# Patient Record
Sex: MALE | Race: WHITE | Employment: OTHER | URBAN - METROPOLITAN AREA
[De-identification: names, ages, dates, MRNs, and addresses within clinical notes are randomized per-mention and may not be internally consistent; named-entity substitution may affect disease eponyms.]

---

## 2013-10-23 LAB
CHOLESTEROL, TOTAL: 224 MG/DL
CHOLESTEROL/HDL RATIO: ABNORMAL
HDLC SERPL-MCNC: 32 MG/DL (ref 35–70)
LDL CHOLESTEROL CALCULATED: 166 MG/DL (ref 0–160)
TRIGL SERPL-MCNC: 130 MG/DL
VLDLC SERPL CALC-MCNC: 26 MG/DL

## 2018-10-25 LAB
ALBUMIN SERPL-MCNC: 4.7 G/DL
ALP BLD-CCNC: 54 U/L
ALT SERPL-CCNC: 19 U/L
ANION GAP SERPL CALCULATED.3IONS-SCNC: NORMAL MMOL/L
ANTIBODY: NEGATIVE
AST SERPL-CCNC: 15 U/L
BASOPHILS ABSOLUTE: 0.1 /ΜL
BASOPHILS RELATIVE PERCENT: 0.6 %
BILIRUB SERPL-MCNC: 0.8 MG/DL (ref 0.1–1.4)
BILIRUBIN, URINE: NEGATIVE
BLOOD, URINE: NEGATIVE
BUN BLDV-MCNC: 15 MG/DL
CALCIUM SERPL-MCNC: 9.7 MG/DL
CHLORIDE BLD-SCNC: 102 MMOL/L
CHOLESTEROL, TOTAL: 167 MG/DL
CHOLESTEROL/HDL RATIO: ABNORMAL
CLARITY: CLEAR
CO2: 29 MMOL/L
COLOR: YELLOW
CREAT SERPL-MCNC: 1.2 MG/DL
EOSINOPHILS ABSOLUTE: 0.2 /ΜL
EOSINOPHILS RELATIVE PERCENT: 1.7 %
GFR CALCULATED: 67
GLUCOSE BLD-MCNC: 98 MG/DL
GLUCOSE URINE: NEGATIVE
HCT VFR BLD CALC: 45.4 % (ref 41–53)
HDLC SERPL-MCNC: 29 MG/DL (ref 35–70)
HEMOGLOBIN: 15.1 G/DL (ref 13.5–17.5)
KETONES, URINE: NEGATIVE
LDL CHOLESTEROL CALCULATED: 116 MG/DL (ref 0–160)
LEUKOCYTE ESTERASE, URINE: NEGATIVE
LYMPHOCYTES ABSOLUTE: 2.6 /ΜL
LYMPHOCYTES RELATIVE PERCENT: 26 %
MCH RBC QN AUTO: 30.5 PG
MCHC RBC AUTO-ENTMCNC: 33.3 G/DL
MCV RBC AUTO: 91.9 FL
MONOCYTES ABSOLUTE: 0.6 /ΜL
MONOCYTES RELATIVE PERCENT: 5.7 %
NEUTROPHILS ABSOLUTE: 6.7 /ΜL
NEUTROPHILS RELATIVE PERCENT: 66 %
NITRITE, URINE: NEGATIVE
PDW BLD-RTO: 13.7 %
PH UA: 6 (ref 4.5–8)
PLATELET # BLD: 204 K/ΜL
PMV BLD AUTO: NORMAL FL
POTASSIUM SERPL-SCNC: 3.9 MMOL/L
PROTEIN UA: NORMAL
RBC # BLD: 4.94 10^6/ΜL
SODIUM BLD-SCNC: 145 MMOL/L
SPECIFIC GRAVITY, URINE: 1.03
TOTAL PROTEIN: 6.8
TRIGL SERPL-MCNC: 109 MG/DL
UROBILINOGEN, URINE: NORMAL
VLDLC SERPL CALC-MCNC: 22 MG/DL
WBC # BLD: 10.1 10^3/ML

## 2019-01-07 ENCOUNTER — TELEPHONE (OUTPATIENT)
Dept: INTERNAL MEDICINE CLINIC | Age: 59
End: 2019-01-07

## 2019-01-07 NOTE — TELEPHONE ENCOUNTER
Patient last seen 10/25/18 requesting refill  Zolpidem 10 mg to [facility] R [phone]

## 2019-04-09 ENCOUNTER — OFFICE VISIT (OUTPATIENT)
Dept: INTERNAL MEDICINE CLINIC | Age: 59
End: 2019-04-09
Payer: COMMERCIAL

## 2019-04-09 VITALS
WEIGHT: 219.6 LBS | DIASTOLIC BLOOD PRESSURE: 60 MMHG | SYSTOLIC BLOOD PRESSURE: 120 MMHG | HEART RATE: 78 BPM | OXYGEN SATURATION: 98 %

## 2019-04-09 DIAGNOSIS — E78.5 HYPERLIPIDEMIA, UNSPECIFIED HYPERLIPIDEMIA TYPE: ICD-10-CM

## 2019-04-09 DIAGNOSIS — G47.00 INSOMNIA, UNSPECIFIED TYPE: Primary | ICD-10-CM

## 2019-04-09 PROCEDURE — 99213 OFFICE O/P EST LOW 20 MIN: CPT | Performed by: FAMILY MEDICINE

## 2019-04-09 RX ORDER — ATORVASTATIN CALCIUM 10 MG/1
10 TABLET, FILM COATED ORAL DAILY
COMMUNITY
End: 2019-04-09 | Stop reason: SDUPTHER

## 2019-04-09 RX ORDER — ATORVASTATIN CALCIUM 10 MG/1
10 TABLET, FILM COATED ORAL DAILY
Qty: 90 TABLET | Refills: 1 | Status: SHIPPED | OUTPATIENT
Start: 2019-04-09 | End: 2019-11-20

## 2019-04-09 RX ORDER — ZOLPIDEM TARTRATE 10 MG/1
10 TABLET ORAL NIGHTLY PRN
COMMUNITY
Start: 2017-02-15 | End: 2019-04-09 | Stop reason: SDUPTHER

## 2019-04-09 RX ORDER — ZOLPIDEM TARTRATE 10 MG/1
10 TABLET ORAL NIGHTLY PRN
Qty: 90 TABLET | Refills: 0 | Status: SHIPPED | OUTPATIENT
Start: 2019-04-09 | End: 2019-07-08

## 2019-04-09 RX ORDER — VALACYCLOVIR HYDROCHLORIDE 1 G/1
1000 TABLET, FILM COATED ORAL DAILY
COMMUNITY
End: 2020-02-26 | Stop reason: SDUPTHER

## 2019-04-09 ASSESSMENT — PATIENT HEALTH QUESTIONNAIRE - PHQ9
SUM OF ALL RESPONSES TO PHQ QUESTIONS 1-9: 0
SUM OF ALL RESPONSES TO PHQ QUESTIONS 1-9: 0
SUM OF ALL RESPONSES TO PHQ9 QUESTIONS 1 & 2: 0
2. FEELING DOWN, DEPRESSED OR HOPELESS: 0
1. LITTLE INTEREST OR PLEASURE IN DOING THINGS: 0

## 2019-04-14 ASSESSMENT — ENCOUNTER SYMPTOMS
BACK PAIN: 0
COUGH: 0
CHEST TIGHTNESS: 0
ABDOMINAL PAIN: 0
SHORTNESS OF BREATH: 0
NAUSEA: 0

## 2019-04-14 NOTE — PROGRESS NOTES
Maxwell Dewey  1960  63 y.o.  male    SUBJECTIVE:    History of Present Illness  Maxwell Dewey is a 63 y.o. male who presents today for HL and insomnia. Compunet labs reviewed. No Cp or Sob.  -HL-, HDL 29, , . He is on Lipitor 10 mg. He prefers to stay on the lower dose  -Insomnia-- He is on Ambien 10 mg. Sometimes he uses 1/2 a pill. He has struggled with insomnia for years. Review of Systems   Constitutional: Negative for diaphoresis, fatigue and fever. HENT: Negative. Respiratory: Negative for cough, chest tightness and shortness of breath. Cardiovascular: Negative for chest pain and palpitations. Gastrointestinal: Negative for abdominal pain and nausea. Genitourinary: Negative for difficulty urinating. Musculoskeletal: Negative for back pain. Neurological: Negative for dizziness and headaches.    Psychiatric/Behavioral:        No depression       Allergies   Allergen Reactions    Demerol     Simvastatin      Cramps       Past Medical History:   Diagnosis Date    Depression     Exposure to viral disease     GERD with esophagitis     Glaucoma     Herpes labialis     Hyperlipidemia     Insomnia     Major depressive disorder, single episode, unspecified     Osteoarthritis     Right sacral radiculopathy     Tarsal tunnel syndrome        Past Surgical History:   Procedure Laterality Date    COLONOSCOPY  04/09/2018    colon polyps, divertcula, Hem- Dr Gary Henriquez History     Tobacco Use    Smoking status: Never Smoker    Smokeless tobacco: Never Used   Substance Use Topics    Alcohol use: Not on file    Drug use: Not on file       Current Outpatient Medications   Medication Sig Dispense Refill    valACYclovir (VALTREX) 1 g tablet Take 1,000 mg by mouth daily      atorvastatin (LIPITOR) 10 MG tablet Take 1 tablet by mouth daily 90 tablet 1    zolpidem (AMBIEN) 10 MG tablet Take 1 tablet by mouth nightly as needed for Sleep for up to 90 days. 90 tablet 0     No current facility-administered medications for this visit. OBJECTIVE:    /60 (Site: Right Upper Arm, Position: Sitting, Cuff Size: Medium Adult)   Pulse 78   Wt 219 lb 9.6 oz (99.6 kg)   SpO2 98%     Physical Exam   Constitutional: He is oriented to person, place, and time. He appears well-developed. No distress. HENT:   Nose: Nose normal.   Mouth/Throat: Oropharynx is clear and moist.   Eyes: Conjunctivae are normal.   Neck: Neck supple. Cardiovascular: Normal rate, regular rhythm and normal heart sounds. Pulmonary/Chest: Effort normal and breath sounds normal. No respiratory distress. Abdominal: Soft. Bowel sounds are normal. There is no tenderness. Neurological: He is alert and oriented to person, place, and time. No cranial nerve deficit. Psychiatric: He has a normal mood and affect. Vitals reviewed. ASSESSMENT:  1. Insomnia, unspecified type    2. Hyperlipidemia, unspecified hyperlipidemia type        PLAN:  Orders Placed This Encounter   Medications    atorvastatin (LIPITOR) 10 MG tablet     Sig: Take 1 tablet by mouth daily     Dispense:  90 tablet     Refill:  1    zolpidem (AMBIEN) 10 MG tablet     Sig: Take 1 tablet by mouth nightly as needed for Sleep for up to 90 days. Dispense:  90 tablet     Refill:  0   Continue medications  ADR's explained. Use Ambien sparingly  Contract signed  The patient is asked to make an attempt to improve diet and exercise patterns to aid in medical management of this problem. Attestation: The Prescription Monitoring Report for this patient was reviewed today. Silvina Wheeler DO)    Return in about 4 months (around 8/9/2019) for insomnia.     Electronically Signed by Silvina Wheeler DO

## 2019-08-14 ENCOUNTER — OFFICE VISIT (OUTPATIENT)
Dept: INTERNAL MEDICINE CLINIC | Age: 59
End: 2019-08-14
Payer: COMMERCIAL

## 2019-08-14 VITALS
SYSTOLIC BLOOD PRESSURE: 102 MMHG | OXYGEN SATURATION: 96 % | DIASTOLIC BLOOD PRESSURE: 70 MMHG | BODY MASS INDEX: 31.55 KG/M2 | HEART RATE: 70 BPM | WEIGHT: 213 LBS | HEIGHT: 69 IN

## 2019-08-14 DIAGNOSIS — G47.00 INSOMNIA, UNSPECIFIED TYPE: Primary | ICD-10-CM

## 2019-08-14 DIAGNOSIS — F32.A DEPRESSION, UNSPECIFIED DEPRESSION TYPE: ICD-10-CM

## 2019-08-14 PROCEDURE — 99213 OFFICE O/P EST LOW 20 MIN: CPT | Performed by: FAMILY MEDICINE

## 2019-08-14 RX ORDER — SERTRALINE HYDROCHLORIDE 100 MG/1
100 TABLET, FILM COATED ORAL DAILY
Qty: 90 TABLET | Refills: 0 | Status: SHIPPED | OUTPATIENT
Start: 2019-08-14 | End: 2019-11-04 | Stop reason: SDUPTHER

## 2019-08-14 RX ORDER — ZOLPIDEM TARTRATE 10 MG/1
TABLET ORAL NIGHTLY PRN
COMMUNITY
End: 2019-08-14 | Stop reason: SDUPTHER

## 2019-08-14 RX ORDER — ZOLPIDEM TARTRATE 10 MG/1
10 TABLET ORAL NIGHTLY PRN
Qty: 30 TABLET | Refills: 2 | Status: SHIPPED | OUTPATIENT
Start: 2019-08-14 | End: 2019-09-13

## 2019-08-19 ASSESSMENT — ENCOUNTER SYMPTOMS
SHORTNESS OF BREATH: 0
COUGH: 0
BACK PAIN: 0
NAUSEA: 0
CHEST TIGHTNESS: 0
ABDOMINAL PAIN: 0

## 2019-11-04 RX ORDER — SERTRALINE HYDROCHLORIDE 100 MG/1
TABLET, FILM COATED ORAL
Qty: 90 TABLET | Refills: 0 | Status: SHIPPED | OUTPATIENT
Start: 2019-11-04 | End: 2020-02-03

## 2019-11-20 ENCOUNTER — OFFICE VISIT (OUTPATIENT)
Dept: INTERNAL MEDICINE CLINIC | Age: 59
End: 2019-11-20
Payer: COMMERCIAL

## 2019-11-20 VITALS
WEIGHT: 213.6 LBS | HEART RATE: 74 BPM | OXYGEN SATURATION: 96 % | HEIGHT: 69 IN | SYSTOLIC BLOOD PRESSURE: 112 MMHG | DIASTOLIC BLOOD PRESSURE: 74 MMHG | BODY MASS INDEX: 31.64 KG/M2

## 2019-11-20 DIAGNOSIS — Z12.5 SCREENING PSA (PROSTATE SPECIFIC ANTIGEN): ICD-10-CM

## 2019-11-20 DIAGNOSIS — M54.2 NECK PAIN: ICD-10-CM

## 2019-11-20 DIAGNOSIS — E78.5 HYPERLIPIDEMIA, UNSPECIFIED HYPERLIPIDEMIA TYPE: ICD-10-CM

## 2019-11-20 DIAGNOSIS — G47.00 INSOMNIA, UNSPECIFIED TYPE: ICD-10-CM

## 2019-11-20 DIAGNOSIS — F32.A DEPRESSION, UNSPECIFIED DEPRESSION TYPE: ICD-10-CM

## 2019-11-20 DIAGNOSIS — Z00.00 ANNUAL PHYSICAL EXAM: Primary | ICD-10-CM

## 2019-11-20 PROCEDURE — 99396 PREV VISIT EST AGE 40-64: CPT | Performed by: FAMILY MEDICINE

## 2019-11-20 RX ORDER — ZOLPIDEM TARTRATE 10 MG/1
10 TABLET ORAL NIGHTLY PRN
Qty: 30 TABLET | Refills: 2 | Status: SHIPPED | OUTPATIENT
Start: 2019-11-20 | End: 2019-12-20

## 2019-11-24 ASSESSMENT — ENCOUNTER SYMPTOMS
CONSTIPATION: 0
CHEST TIGHTNESS: 0
BLOOD IN STOOL: 0
COUGH: 0
BACK PAIN: 0
ABDOMINAL PAIN: 0
EYES NEGATIVE: 1
DIARRHEA: 0
SHORTNESS OF BREATH: 0
NAUSEA: 0

## 2020-02-03 RX ORDER — SERTRALINE HYDROCHLORIDE 100 MG/1
TABLET, FILM COATED ORAL
Qty: 90 TABLET | Refills: 0 | Status: SHIPPED | OUTPATIENT
Start: 2020-02-03 | End: 2020-02-26 | Stop reason: SDUPTHER

## 2020-02-26 ENCOUNTER — OFFICE VISIT (OUTPATIENT)
Dept: INTERNAL MEDICINE CLINIC | Age: 60
End: 2020-02-26
Payer: COMMERCIAL

## 2020-02-26 VITALS
HEART RATE: 61 BPM | HEIGHT: 69 IN | DIASTOLIC BLOOD PRESSURE: 84 MMHG | BODY MASS INDEX: 30.66 KG/M2 | WEIGHT: 207 LBS | SYSTOLIC BLOOD PRESSURE: 122 MMHG | OXYGEN SATURATION: 98 %

## 2020-02-26 PROCEDURE — G0444 DEPRESSION SCREEN ANNUAL: HCPCS | Performed by: FAMILY MEDICINE

## 2020-02-26 PROCEDURE — 99213 OFFICE O/P EST LOW 20 MIN: CPT | Performed by: FAMILY MEDICINE

## 2020-02-26 RX ORDER — ZOLPIDEM TARTRATE 10 MG/1
10 TABLET ORAL NIGHTLY PRN
Qty: 30 TABLET | Refills: 2 | Status: SHIPPED | OUTPATIENT
Start: 2020-02-26 | End: 2020-05-19 | Stop reason: SDUPTHER

## 2020-02-26 RX ORDER — VALACYCLOVIR HYDROCHLORIDE 1 G/1
1000 TABLET, FILM COATED ORAL DAILY
Qty: 30 TABLET | Refills: 2 | Status: SHIPPED | OUTPATIENT
Start: 2020-02-26 | End: 2020-03-19

## 2020-02-26 RX ORDER — SERTRALINE HYDROCHLORIDE 100 MG/1
100 TABLET, FILM COATED ORAL 2 TIMES DAILY
Qty: 180 TABLET | Refills: 1 | Status: SHIPPED | OUTPATIENT
Start: 2020-02-26 | End: 2020-09-10 | Stop reason: SDUPTHER

## 2020-02-26 ASSESSMENT — PATIENT HEALTH QUESTIONNAIRE - PHQ9
SUM OF ALL RESPONSES TO PHQ QUESTIONS 1-9: 10
4. FEELING TIRED OR HAVING LITTLE ENERGY: 2
5. POOR APPETITE OR OVEREATING: 1
6. FEELING BAD ABOUT YOURSELF - OR THAT YOU ARE A FAILURE OR HAVE LET YOURSELF OR YOUR FAMILY DOWN: 0
1. LITTLE INTEREST OR PLEASURE IN DOING THINGS: 3
3. TROUBLE FALLING OR STAYING ASLEEP: 1
8. MOVING OR SPEAKING SO SLOWLY THAT OTHER PEOPLE COULD HAVE NOTICED. OR THE OPPOSITE, BEING SO FIGETY OR RESTLESS THAT YOU HAVE BEEN MOVING AROUND A LOT MORE THAN USUAL: 0
10. IF YOU CHECKED OFF ANY PROBLEMS, HOW DIFFICULT HAVE THESE PROBLEMS MADE IT FOR YOU TO DO YOUR WORK, TAKE CARE OF THINGS AT HOME, OR GET ALONG WITH OTHER PEOPLE: 1
7. TROUBLE CONCENTRATING ON THINGS, SUCH AS READING THE NEWSPAPER OR WATCHING TELEVISION: 0
9. THOUGHTS THAT YOU WOULD BE BETTER OFF DEAD, OR OF HURTING YOURSELF: 0
SUM OF ALL RESPONSES TO PHQ9 QUESTIONS 1 & 2: 6
SUM OF ALL RESPONSES TO PHQ QUESTIONS 1-9: 10
2. FEELING DOWN, DEPRESSED OR HOPELESS: 3

## 2020-02-26 ASSESSMENT — ENCOUNTER SYMPTOMS
SHORTNESS OF BREATH: 0
CHEST TIGHTNESS: 0
ABDOMINAL PAIN: 0
BACK PAIN: 0
NAUSEA: 0
COUGH: 0

## 2020-02-26 NOTE — PROGRESS NOTES
Sonia Salazar  1960  63 y.o.  male    Chief Complaint   Patient presents with    3 Month Follow-Up         History of Present Illness  Sonia Salazar is a 63 y.o. male who presents today for a check up. He did not do the last labs. No Cp or Sob.  -Pt has insomnia that has been controlled on Ambien. He denies problems with the medication.   -Depression. He has increased the Zoloft 100 to 2 a day. He denies SI or plan. He was feeling down lately. He is trying to go out with his friends and stay active.   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 Respiratory: Negative for cough, chest tightness and shortness of breath. Cardiovascular: Negative for chest pain and palpitations. Gastrointestinal: Negative for abdominal pain and nausea. Genitourinary: Negative for difficulty urinating. Musculoskeletal: Negative for back pain. Neurological: Negative for dizziness and headaches. Psychiatric/Behavioral: Positive for dysphoric mood. Negative for suicidal ideas. Allergies   Allergen Reactions    Demerol     Simvastatin      Cramps       Past Medical History:   Diagnosis Date    Depression     Exposure to viral disease     GERD with esophagitis     Glaucoma     Herpes labialis     Hyperlipidemia     Insomnia     Major depressive disorder, single episode, unspecified     Osteoarthritis     Right sacral radiculopathy     Tarsal tunnel syndrome        Past Surgical History:   Procedure Laterality Date    COLONOSCOPY  04/09/2018    colon polyps, divertcula, Hem- Dr Sravani Prater         History reviewed. No pertinent family history.     Social History     Tobacco Use    Smoking status: Never Smoker    Smokeless tobacco: Never Used   Substance Use Topics    Alcohol use: Not on file    Drug use: Not on file       Current Outpatient Medications   Medication Sig Dispense

## 2020-03-19 RX ORDER — VALACYCLOVIR HYDROCHLORIDE 1 G/1
TABLET, FILM COATED ORAL
Qty: 30 TABLET | Refills: 3 | Status: SHIPPED | OUTPATIENT
Start: 2020-03-19 | End: 2020-06-23 | Stop reason: SDUPTHER

## 2020-05-19 RX ORDER — ZOLPIDEM TARTRATE 10 MG/1
10 TABLET ORAL NIGHTLY PRN
Qty: 30 TABLET | Refills: 2 | Status: SHIPPED | OUTPATIENT
Start: 2020-05-19 | End: 2020-06-18

## 2020-06-23 RX ORDER — VALACYCLOVIR HYDROCHLORIDE 1 G/1
1000 TABLET, FILM COATED ORAL DAILY
Qty: 30 TABLET | Refills: 3 | Status: SHIPPED | OUTPATIENT
Start: 2020-06-23

## 2020-08-04 ENCOUNTER — OFFICE VISIT (OUTPATIENT)
Dept: INTERNAL MEDICINE CLINIC | Age: 60
End: 2020-08-04
Payer: COMMERCIAL

## 2020-08-04 VITALS
DIASTOLIC BLOOD PRESSURE: 62 MMHG | HEIGHT: 70 IN | TEMPERATURE: 97.2 F | OXYGEN SATURATION: 92 % | HEART RATE: 69 BPM | SYSTOLIC BLOOD PRESSURE: 104 MMHG | BODY MASS INDEX: 30.12 KG/M2 | WEIGHT: 210.4 LBS

## 2020-08-04 PROBLEM — F33.1 MODERATE EPISODE OF RECURRENT MAJOR DEPRESSIVE DISORDER (HCC): Status: ACTIVE | Noted: 2020-08-04

## 2020-08-04 PROBLEM — F51.01 PRIMARY INSOMNIA: Status: ACTIVE | Noted: 2020-08-04

## 2020-08-04 PROCEDURE — 99213 OFFICE O/P EST LOW 20 MIN: CPT | Performed by: FAMILY MEDICINE

## 2020-08-04 RX ORDER — ZOLPIDEM TARTRATE 10 MG/1
10 TABLET ORAL NIGHTLY
Qty: 30 TABLET | Refills: 2 | Status: SHIPPED | OUTPATIENT
Start: 2020-08-04 | End: 2020-09-03

## 2020-08-04 RX ORDER — ZOLPIDEM TARTRATE 10 MG/1
10 TABLET ORAL NIGHTLY
COMMUNITY
Start: 2020-07-24 | End: 2020-08-04 | Stop reason: SDUPTHER

## 2020-08-04 ASSESSMENT — ENCOUNTER SYMPTOMS
BACK PAIN: 0
CHEST TIGHTNESS: 0
ABDOMINAL PAIN: 0
NAUSEA: 0
SHORTNESS OF BREATH: 0
COUGH: 0

## 2020-08-04 NOTE — PROGRESS NOTES
daily 30 tablet 3    sertraline (ZOLOFT) 100 MG tablet Take 1 tablet by mouth 2 times daily 180 tablet 1     No current facility-administered medications for this visit. OBJECTIVE:    /62   Pulse 69   Temp 97.2 °F (36.2 °C)   Ht 5' 10\" (1.778 m)   Wt 210 lb 6.4 oz (95.4 kg)   SpO2 92%   BMI 30.19 kg/m²     Physical Exam  Vitals signs reviewed. Constitutional:       General: He is not in acute distress. Appearance: He is well-developed. Eyes:      Extraocular Movements: Extraocular movements intact. Conjunctiva/sclera: Conjunctivae normal.      Pupils: Pupils are equal, round, and reactive to light. Neck:      Musculoskeletal: Neck supple. Muscular tenderness (posterior muscle tension) present. Cardiovascular:      Rate and Rhythm: Normal rate and regular rhythm. Heart sounds: Normal heart sounds. Pulmonary:      Effort: Pulmonary effort is normal. No respiratory distress. Breath sounds: Normal breath sounds. Abdominal:      General: Bowel sounds are normal.      Palpations: Abdomen is soft. Tenderness: There is no abdominal tenderness. Musculoskeletal:      Right lower leg: No edema. Left lower leg: No edema. Neurological:      Mental Status: He is alert and oriented to person, place, and time. Cranial Nerves: No cranial nerve deficit. Sensory: No sensory deficit. Motor: No weakness. Gait: Gait normal.   Psychiatric:         Mood and Affect: Mood normal.         ASSESSMENT:  1. Primary insomnia    2. Moderate episode of recurrent major depressive disorder (HCC)    3. Strain of neck muscle, initial encounter        PLAN:  Orders Placed This Encounter   Medications    zolpidem (AMBIEN) 10 MG tablet     Sig: Take 1 tablet by mouth nightly for 30 days.      Dispense:  30 tablet     Refill:  2   Pt wants to monitor neck symptoms  Refills  ADR's explained  Obtain labs  Persist RTO or call         Return in about 3 months (around

## 2020-08-19 LAB
ALBUMIN SERPL-MCNC: 4.3 G/DL
ALP BLD-CCNC: 53 U/L
ALT SERPL-CCNC: 19 U/L
ANION GAP SERPL CALCULATED.3IONS-SCNC: NORMAL MMOL/L
AST SERPL-CCNC: 18 U/L
BASOPHILS ABSOLUTE: 0.1 /ΜL
BASOPHILS RELATIVE PERCENT: 1 %
BILIRUB SERPL-MCNC: 0.5 MG/DL (ref 0.1–1.4)
BUN BLDV-MCNC: 17 MG/DL
CALCIUM SERPL-MCNC: 9.2 MG/DL
CHLORIDE BLD-SCNC: 98 MMOL/L
CHOLESTEROL, TOTAL: 280 MG/DL
CHOLESTEROL/HDL RATIO: ABNORMAL
CO2: 27 MMOL/L
CREAT SERPL-MCNC: 1 MG/DL
EOSINOPHILS ABSOLUTE: 0.2 /ΜL
EOSINOPHILS RELATIVE PERCENT: 2.3 %
GFR CALCULATED: NORMAL
GLUCOSE BLD-MCNC: 88 MG/DL
HCT VFR BLD CALC: 42.9 % (ref 41–53)
HDLC SERPL-MCNC: 30 MG/DL (ref 35–70)
HEMOGLOBIN: 14.5 G/DL (ref 13.5–17.5)
LDL CHOLESTEROL CALCULATED: ABNORMAL
LYMPHOCYTES ABSOLUTE: 3.1 /ΜL
LYMPHOCYTES RELATIVE PERCENT: 29.8 %
MCH RBC QN AUTO: 30.9 PG
MCHC RBC AUTO-ENTMCNC: 33.9 G/DL
MCV RBC AUTO: 91.1 FL
MONOCYTES ABSOLUTE: 0.7 /ΜL
MONOCYTES RELATIVE PERCENT: 6.6 %
NEUTROPHILS ABSOLUTE: 6.4 /ΜL
NEUTROPHILS RELATIVE PERCENT: 60.3 %
NONHDLC SERPL-MCNC: ABNORMAL MG/DL
PDW BLD-RTO: 14.2 %
PLATELET # BLD: 218 K/ΜL
PMV BLD AUTO: NORMAL FL
POTASSIUM SERPL-SCNC: 4.5 MMOL/L
PROSTATE SPECIFIC ANTIGEN: 0.65 NG/ML
RBC # BLD: 4.71 10^6/ΜL
SODIUM BLD-SCNC: 138 MMOL/L
TOTAL PROTEIN: 6.6
TRIGL SERPL-MCNC: 186 MG/DL
VLDLC SERPL CALC-MCNC: 37 MG/DL
WBC # BLD: 10.5 10^3/ML

## 2020-09-10 RX ORDER — SERTRALINE HYDROCHLORIDE 100 MG/1
100 TABLET, FILM COATED ORAL 2 TIMES DAILY
Qty: 180 TABLET | Refills: 1 | Status: SHIPPED | OUTPATIENT
Start: 2020-09-10 | End: 2021-03-11 | Stop reason: SDUPTHER

## 2020-12-07 ENCOUNTER — OFFICE VISIT (OUTPATIENT)
Dept: INTERNAL MEDICINE CLINIC | Age: 60
End: 2020-12-07
Payer: COMMERCIAL

## 2020-12-07 VITALS
OXYGEN SATURATION: 98 % | BODY MASS INDEX: 30.8 KG/M2 | DIASTOLIC BLOOD PRESSURE: 70 MMHG | WEIGHT: 220 LBS | HEART RATE: 70 BPM | HEIGHT: 71 IN | SYSTOLIC BLOOD PRESSURE: 120 MMHG | TEMPERATURE: 97.7 F

## 2020-12-07 PROBLEM — E78.5 HYPERLIPIDEMIA: Status: ACTIVE | Noted: 2020-12-07

## 2020-12-07 PROCEDURE — 99396 PREV VISIT EST AGE 40-64: CPT | Performed by: FAMILY MEDICINE

## 2020-12-07 PROCEDURE — 90471 IMMUNIZATION ADMIN: CPT | Performed by: FAMILY MEDICINE

## 2020-12-07 PROCEDURE — 90686 IIV4 VACC NO PRSV 0.5 ML IM: CPT | Performed by: FAMILY MEDICINE

## 2020-12-07 RX ORDER — ZOLPIDEM TARTRATE 5 MG/1
5 TABLET ORAL NIGHTLY PRN
Qty: 30 TABLET | Refills: 2 | Status: SHIPPED | OUTPATIENT
Start: 2020-12-07 | End: 2021-01-06

## 2020-12-07 RX ORDER — ZOLPIDEM TARTRATE 5 MG/1
5 TABLET ORAL NIGHTLY PRN
COMMUNITY
End: 2020-12-07 | Stop reason: SDUPTHER

## 2020-12-07 ASSESSMENT — ENCOUNTER SYMPTOMS
DIARRHEA: 0
CONSTIPATION: 0
NAUSEA: 0
ABDOMINAL PAIN: 0
EYES NEGATIVE: 1
BLOOD IN STOOL: 0
SHORTNESS OF BREATH: 0
BACK PAIN: 0
CHEST TIGHTNESS: 0
COUGH: 0

## 2020-12-07 NOTE — PROGRESS NOTES
Davida Soriano  1960  62 y.o.  male    Chief Complaint   Patient presents with    Annual Exam    Flu Vaccine         History of Present Illness  Davida Soriano is a 62 y.o. male who presents today for a annual exam.  Patient Active Problem List    Diagnosis Date Noted    Glaucoma     Mild episode of recurrent major depressive disorder ([facility] [address]     Hyperlipidemia 12/07/2020    Herpes labialis     Primary insomnia 08/04/2020   -Last labs reviewed. Last C-scope 4/2018 with Dr. Salomon Cm. Recall in 5 years. PSA nl  - Insomnia- He would like a refill on the Ambien. Last fill 8/20. He can still have some sleep disturbance  -Depression- Stable on Zoloft  -HLD- Uncontrolled-chronic. , HDL 30, Trig 186. He is at higher risk and should be on a Statin. He declines. Hx of some cramping with Statins in the past, and he rather not use. Review of Systems   Constitutional: Negative for diaphoresis and fever. HENT: Negative. Eyes: Negative. Respiratory: Negative for cough, chest tightness and shortness of breath. Cardiovascular: Negative for chest pain and palpitations. Gastrointestinal: Negative for abdominal pain, blood in stool, constipation, diarrhea and nausea. Genitourinary: Negative for difficulty urinating. Musculoskeletal: Negative for back pain and neck pain. Neurological: Negative for dizziness and headaches. Psychiatric/Behavioral: Negative for dysphoric mood and suicidal ideas.        Allergies   Allergen Reactions    Demerol     Simvastatin      Cramps       Past Medical History:   Diagnosis Date    Depression     Exposure to viral disease     GERD with esophagitis     Glaucoma     Herpes labialis     Hyperlipidemia     Insomnia     Mild episode of recurrent major depressive disorder (HCC)     Osteoarthritis     Right sacral radiculopathy     Tarsal tunnel syndrome        Past Surgical History:   Procedure Laterality Date    COLONOSCOPY  04/09/2018 colon polyps, divertcula, Hem- Dr Kenton Maldonado         History reviewed. No pertinent family history. Social History     Tobacco Use    Smoking status: Never Smoker    Smokeless tobacco: Never Used   Substance Use Topics    Alcohol use: Not on file    Drug use: Not on file       Current Outpatient Medications   Medication Sig Dispense Refill    zolpidem (AMBIEN) 5 MG tablet Take 1 tablet by mouth nightly as needed for Sleep for up to 30 days. 30 tablet 2    sertraline (ZOLOFT) 100 MG tablet Take 1 tablet by mouth 2 times daily 180 tablet 1    valACYclovir (VALTREX) 1 g tablet Take 1 tablet by mouth daily 30 tablet 3     No current facility-administered medications for this visit. OBJECTIVE:    /70   Pulse 70   Temp 97.7 °F (36.5 °C)   Ht 5' 11\" (1.803 m)   Wt 220 lb (99.8 kg)   SpO2 98%   BMI 30.68 kg/m²     Physical Exam  Vitals signs reviewed. Constitutional:       General: He is not in acute distress. Appearance: He is well-developed. HENT:      Head: Normocephalic and atraumatic. Right Ear: Tympanic membrane normal.      Left Ear: Tympanic membrane normal.      Nose: Nose normal.      Mouth/Throat:      Mouth: Mucous membranes are moist.   Eyes:      Extraocular Movements: Extraocular movements intact. Conjunctiva/sclera: Conjunctivae normal.      Pupils: Pupils are equal, round, and reactive to light. Neck:      Musculoskeletal: Neck supple. Cardiovascular:      Rate and Rhythm: Normal rate and regular rhythm. Pulmonary:      Effort: Pulmonary effort is normal. No respiratory distress. Breath sounds: Normal breath sounds. Abdominal:      General: Bowel sounds are normal.      Palpations: Abdomen is soft. Tenderness: There is no abdominal tenderness. Musculoskeletal: Normal range of motion. Right lower leg: No edema. Left lower leg: No edema.    Neurological: Mental Status: He is alert and oriented to person, place, and time. Cranial Nerves: No cranial nerve deficit. Psychiatric:         Mood and Affect: Mood normal.         ASSESSMENT:  1. Annual physical exam    2. Primary insomnia    3. Mild episode of recurrent major depressive disorder ([facility] [address]    4. Hyperlipidemia, unspecified hyperlipidemia type    5. Need for immunization against influenza        PLAN:  Orders Placed This Encounter   Medications    zolpidem (AMBIEN) 5 MG tablet     Sig: Take 1 tablet by mouth nightly as needed for Sleep for up to 30 days. Dispense:  30 tablet     Refill:  2   Continue medications  He declines cholesterol medications   ADR's explained  Sleep hygiene  Influenza vaccination  The patient is asked to make an attempt to improve diet and exercise patterns to aid in medical management of this problem. Return in about 3 months (around 3/7/2021) for Insomnia.     Electronically Signed by Harry Gonzáles DO

## 2020-12-13 PROBLEM — F33.1 MODERATE EPISODE OF RECURRENT MAJOR DEPRESSIVE DISORDER (HCC): Status: RESOLVED | Noted: 2020-08-04 | Resolved: 2020-12-13

## 2021-03-11 ENCOUNTER — OFFICE VISIT (OUTPATIENT)
Dept: INTERNAL MEDICINE CLINIC | Age: 61
End: 2021-03-11
Payer: COMMERCIAL

## 2021-03-11 VITALS
WEIGHT: 224 LBS | SYSTOLIC BLOOD PRESSURE: 130 MMHG | BODY MASS INDEX: 31.24 KG/M2 | DIASTOLIC BLOOD PRESSURE: 70 MMHG | HEART RATE: 79 BPM | TEMPERATURE: 97 F | OXYGEN SATURATION: 96 %

## 2021-03-11 DIAGNOSIS — F51.01 PRIMARY INSOMNIA: Primary | ICD-10-CM

## 2021-03-11 DIAGNOSIS — F33.0 MILD EPISODE OF RECURRENT MAJOR DEPRESSIVE DISORDER (HCC): ICD-10-CM

## 2021-03-11 PROCEDURE — 99213 OFFICE O/P EST LOW 20 MIN: CPT | Performed by: FAMILY MEDICINE

## 2021-03-11 RX ORDER — ZOLPIDEM TARTRATE 5 MG/1
1 TABLET ORAL DAILY
COMMUNITY
Start: 2021-03-05 | End: 2021-03-11 | Stop reason: SDUPTHER

## 2021-03-11 RX ORDER — ZOLPIDEM TARTRATE 5 MG/1
5 TABLET ORAL DAILY
Qty: 30 TABLET | Refills: 2 | Status: SHIPPED | OUTPATIENT
Start: 2021-03-11 | End: 2021-04-10

## 2021-03-11 RX ORDER — SERTRALINE HYDROCHLORIDE 100 MG/1
100 TABLET, FILM COATED ORAL 2 TIMES DAILY
Qty: 180 TABLET | Refills: 1 | Status: SHIPPED | OUTPATIENT
Start: 2021-03-11 | End: 2021-09-22 | Stop reason: SDUPTHER

## 2021-03-11 SDOH — HEALTH STABILITY: MENTAL HEALTH: HOW MANY STANDARD DRINKS CONTAINING ALCOHOL DO YOU HAVE ON A TYPICAL DAY?: NOT ASKED

## 2021-03-11 ASSESSMENT — ENCOUNTER SYMPTOMS
CHEST TIGHTNESS: 0
ABDOMINAL PAIN: 0
BACK PAIN: 0
COUGH: 0
SHORTNESS OF BREATH: 0
NAUSEA: 0

## 2021-03-11 NOTE — PROGRESS NOTES
Ashley Tolbert  1960  62 y.o.  male    Chief Complaint   Patient presents with    3 Month Follow-Up    Insomnia         History of Present Illness  Ashley Tolbert is a 62 y.o. male who presents today for a check up. Patient Active Problem List    Diagnosis Date Noted    Glaucoma     Mild episode of recurrent major depressive disorder ([facility] [address]     Hyperlipidemia 12/07/2020    Herpes labialis     Primary insomnia 08/04/2020     -Insomnia- On Ambien stable  -Depression- Stable    Review of Systems   Constitutional: Negative for diaphoresis and fever. Respiratory: Negative for cough, chest tightness and shortness of breath. Cardiovascular: Negative for chest pain and palpitations. Gastrointestinal: Negative for abdominal pain and nausea. Genitourinary: Negative for difficulty urinating. Musculoskeletal: Negative for back pain. Neurological: Negative for dizziness and headaches. Psychiatric/Behavioral: Negative for dysphoric mood. Allergies   Allergen Reactions    Demerol     Simvastatin      Cramps       Past Medical History:   Diagnosis Date    Depression     Exposure to viral disease     GERD with esophagitis     Glaucoma     Herpes labialis     Hyperlipidemia     Insomnia     Mild episode of recurrent major depressive disorder (HCC)     Osteoarthritis     Right sacral radiculopathy     Tarsal tunnel syndrome        Past Surgical History:   Procedure Laterality Date    COLONOSCOPY  04/09/2018    colon polyps, divertcula, Hem- Dr Glenn Chacon         History reviewed. No pertinent family history.     Social History     Tobacco Use    Smoking status: Never Smoker    Smokeless tobacco: Never Used   Substance Use Topics    Alcohol use: Never     Frequency: Never     Binge frequency: Never    Drug use: Yes     Types: Marijuana       Current Outpatient Medications   Medication Sig Dispense Refill    zolpidem (AMBIEN) 5 MG tablet Take 1 tablet by mouth daily for 30 days. 30 tablet 2    sertraline (ZOLOFT) 100 MG tablet Take 1 tablet by mouth 2 times daily 180 tablet 1    valACYclovir (VALTREX) 1 g tablet Take 1 tablet by mouth daily (Patient not taking: Reported on 3/11/2021) 30 tablet 3     No current facility-administered medications for this visit. OBJECTIVE:    /70   Pulse 79   Temp 97 °F (36.1 °C)   Wt 224 lb (101.6 kg)   SpO2 96%   BMI 31.24 kg/m²     Physical Exam  Vitals signs reviewed. Constitutional:       General: He is not in acute distress. Eyes:      Conjunctiva/sclera: Conjunctivae normal.   Neck:      Musculoskeletal: Neck supple. Cardiovascular:      Rate and Rhythm: Normal rate and regular rhythm. Pulmonary:      Effort: Pulmonary effort is normal. No respiratory distress. Breath sounds: Normal breath sounds. Abdominal:      General: Bowel sounds are normal.      Palpations: Abdomen is soft. Tenderness: There is no abdominal tenderness. Musculoskeletal:      Right lower leg: No edema. Left lower leg: No edema. Neurological:      Mental Status: He is alert and oriented to person, place, and time. Cranial Nerves: No cranial nerve deficit. Psychiatric:         Mood and Affect: Mood normal.         ASSESSMENT:  1. Primary insomnia    2. Mild episode of recurrent major depressive disorder (HCC)        PLAN:    Orders Placed This Encounter   Medications    zolpidem (AMBIEN) 5 MG tablet     Sig: Take 1 tablet by mouth daily for 30 days. Dispense:  30 tablet     Refill:  2    sertraline (ZOLOFT) 100 MG tablet     Sig: Take 1 tablet by mouth 2 times daily     Dispense:  180 tablet     Refill:  1   Oarrs checked  Continue medications  Any problems call or RTO       Return in about 3 months (around 6/11/2021) for insomnia.     Electronically Signed by Sissy Pratt DO

## 2021-04-19 ENCOUNTER — HOSPITAL ENCOUNTER (OUTPATIENT)
Age: 61
Setting detail: SPECIMEN
Discharge: HOME | End: 2021-04-19
Payer: COMMERCIAL

## 2021-04-19 ENCOUNTER — OFFICE VISIT (OUTPATIENT)
Dept: FAMILY MEDICINE CLINIC | Age: 61
End: 2021-04-19
Payer: COMMERCIAL

## 2021-04-19 ENCOUNTER — TELEPHONE (OUTPATIENT)
Dept: INTERNAL MEDICINE CLINIC | Age: 61
End: 2021-04-19

## 2021-04-19 VITALS
WEIGHT: 220 LBS | DIASTOLIC BLOOD PRESSURE: 78 MMHG | TEMPERATURE: 97.4 F | HEART RATE: 69 BPM | BODY MASS INDEX: 30.8 KG/M2 | SYSTOLIC BLOOD PRESSURE: 122 MMHG | HEIGHT: 71 IN | OXYGEN SATURATION: 98 %

## 2021-04-19 DIAGNOSIS — M54.50 ACUTE BILATERAL LOW BACK PAIN WITHOUT SCIATICA: Primary | ICD-10-CM

## 2021-04-19 DIAGNOSIS — R10.31 RIGHT GROIN PAIN: ICD-10-CM

## 2021-04-19 DIAGNOSIS — N50.811 PAIN IN RIGHT TESTICLE: ICD-10-CM

## 2021-04-19 LAB
BILIRUBIN, POC: NEGATIVE
BLOOD URINE, POC: NEGATIVE
CLARITY, POC: CLEAR
COLOR, POC: YELLOW
GLUCOSE URINE, POC: NEGATIVE
KETONES, POC: NEGATIVE
LEUKOCYTE EST, POC: NEGATIVE
NITRITE, POC: NEGATIVE
PH, POC: 6
PROTEIN, POC: NEGATIVE
SPECIFIC GRAVITY, POC: 1.02
UROBILINOGEN, POC: NORMAL

## 2021-04-19 PROCEDURE — 81002 URINALYSIS NONAUTO W/O SCOPE: CPT | Performed by: NURSE PRACTITIONER

## 2021-04-19 PROCEDURE — 99213 OFFICE O/P EST LOW 20 MIN: CPT | Performed by: NURSE PRACTITIONER

## 2021-04-19 PROCEDURE — 87086 URINE CULTURE/COLONY COUNT: CPT

## 2021-04-19 RX ORDER — METHYLPREDNISOLONE 4 MG/1
TABLET ORAL
Qty: 1 KIT | Refills: 0 | Status: SHIPPED | OUTPATIENT
Start: 2021-04-19 | End: 2021-04-25

## 2021-04-19 RX ORDER — ACETAMINOPHEN 325 MG/1
650 TABLET ORAL EVERY 6 HOURS PRN
COMMUNITY

## 2021-04-19 RX ORDER — ZOLPIDEM TARTRATE 5 MG/1
TABLET ORAL
COMMUNITY
Start: 2021-04-17 | End: 2021-06-10 | Stop reason: SDUPTHER

## 2021-04-19 RX ORDER — TIZANIDINE 2 MG/1
2 TABLET ORAL 3 TIMES DAILY PRN
Qty: 20 TABLET | Refills: 0 | Status: SHIPPED | OUTPATIENT
Start: 2021-04-19 | End: 2021-05-18

## 2021-04-19 RX ORDER — IBUPROFEN 200 MG
200 TABLET ORAL EVERY 6 HOURS PRN
COMMUNITY

## 2021-04-19 ASSESSMENT — ENCOUNTER SYMPTOMS
BOWEL INCONTINENCE: 0
BACK PAIN: 1
ABDOMINAL PAIN: 0

## 2021-04-19 NOTE — PROGRESS NOTES
Bess Strickland   62 y.o.  male  [id number]      Chief Complaint   Patient presents with    Lower Back Pain     patient states that back pain started about a month ago and has since traveled to his lower right abdomen down into his groin over the last week. Patient has taken otc tylenol and ibuprofen with some relief.  Groin Pain        Subjective:  61 y.o.male is here for a follow up. He has the following chronic/acute medical problems:  Patient Active Problem List   Diagnosis    Primary insomnia    Hyperlipidemia    Herpes labialis    Glaucoma    Mild episode of recurrent major depressive disorder (HCC)       Back Pain  This is a new problem. The current episode started 1 to 4 weeks ago (1 month ago). The problem occurs intermittently. The problem is unchanged. The pain is present in the lumbar spine. The quality of the pain is described as aching. Radiates to: radiated to both hip and right groin area. The pain is at a severity of 6/10. The pain is worse during the day. The symptoms are aggravated by sitting. Pertinent negatives include no abdominal pain, bladder incontinence, bowel incontinence, chest pain, dysuria, fever, headaches, leg pain, numbness, paresis, paresthesias, pelvic pain, perianal numbness, tingling, weakness or weight loss. He has tried NSAIDs (been usin ibuprofen twice a day) for the symptoms. The treatment provided moderate relief. Review of Systems   Constitutional: Negative for appetite change, chills, fatigue, fever and weight loss. HENT: Negative for congestion, ear pain, postnasal drip, rhinorrhea, sinus pressure, sinus pain, sneezing and sore throat. Respiratory: Negative for cough, chest tightness, shortness of breath and wheezing. Cardiovascular: Negative for chest pain and palpitations. Gastrointestinal: Negative for abdominal pain, bowel incontinence, diarrhea, nausea and vomiting.    Genitourinary: Negative for bladder incontinence, dysuria and pelvic pain.   Musculoskeletal: Positive for back pain. Skin: Negative for rash. Neurological: Negative for dizziness, tingling, weakness, light-headedness, numbness, headaches and paresthesias. Current Outpatient Medications   Medication Sig Dispense Refill    zolpidem (AMBIEN) 5 MG tablet       acetaminophen (TYLENOL) 325 MG tablet Take 650 mg by mouth every 6 hours as needed for Pain      ibuprofen (ADVIL;MOTRIN) 200 MG tablet Take 200 mg by mouth every 6 hours as needed for Pain      methylPREDNISolone (MEDROL, WERO,) 4 MG tablet Take by mouth. 1 kit 0    tiZANidine (ZANAFLEX) 2 MG tablet Take 1 tablet by mouth 3 times daily as needed (back pain) 20 tablet 0    sertraline (ZOLOFT) 100 MG tablet Take 1 tablet by mouth 2 times daily 180 tablet 1    valACYclovir (VALTREX) 1 g tablet Take 1 tablet by mouth daily (Patient not taking: Reported on 3/11/2021) 30 tablet 3     No current facility-administered medications for this visit. Past medical, family,surgical history reviewed today. Objective:  /78   Pulse 69   Temp 97.4 °F (36.3 °C)   Ht 5' 11\" (1.803 m)   Wt 220 lb (99.8 kg)   SpO2 98%   BMI 30.68 kg/m²   BP Readings from Last 3 Encounters:   04/19/21 122/78   03/11/21 130/70   12/07/20 120/70     Wt Readings from Last 3 Encounters:   04/19/21 220 lb (99.8 kg)   03/11/21 224 lb (101.6 kg)   12/07/20 220 lb (99.8 kg)         Physical Exam  Exam conducted with a chaperone present. Constitutional:       Appearance: Normal appearance. HENT:      Head: Normocephalic. Neck:      Musculoskeletal: Neck supple. Cardiovascular:      Rate and Rhythm: Normal rate and regular rhythm. Heart sounds: Normal heart sounds. Pulmonary:      Effort: Pulmonary effort is normal.      Breath sounds: Normal breath sounds. Genitourinary:     Testes:         Right: Tenderness or swelling not present.       Comments: Right testicle appeared normal.   Musculoskeletal:      Lumbar back: He exhibits pain. He exhibits normal range of motion, no tenderness, no bony tenderness, no swelling and no edema. Skin:     General: Skin is warm and dry. Neurological:      Mental Status: He is alert and oriented to person, place, and time. Psychiatric:         Mood and Affect: Mood normal.         Behavior: Behavior normal.         Lab Results   Component Value Date    WBC 10.5 08/19/2020    HGB 14.5 08/19/2020    HCT 42.9 08/19/2020    MCV 91.1 08/19/2020     08/19/2020     Lab Results   Component Value Date     08/19/2020    K 4.5 08/19/2020    CL 98 08/19/2020    CO2 27 08/19/2020    BUN 17 08/19/2020    CREATININE 1.0 08/19/2020    GLUCOSE 88 08/19/2020    CALCIUM 9.2 08/19/2020    PROT 6.6 08/31/2011    LABALBU 4.3 08/19/2020    BILITOT 0.5 08/19/2020    ALKPHOS 53 08/19/2020    AST 18 08/19/2020    ALT 19 08/19/2020    LABGLOM 67 10/24/2018    GFRAA >60 08/31/2011     Lab Results   Component Value Date    CHOL 280 08/19/2020    CHOL 167 10/24/2018    CHOL 224 (H) 10/22/2013     Lab Results   Component Value Date    TRIG 186 08/19/2020    TRIG 109 10/24/2018    TRIG 130 10/22/2013     Lab Results   Component Value Date    HDL 30 (A) 08/19/2020    HDL 29 (A) 10/24/2018    HDL 32 (L) 10/22/2013     Lab Results   Component Value Date    LDLCALC 116 10/24/2018    LDLCALC 166 (H) 10/22/2013     No results found for: LABA1C  No results found for: TSHFT4, TSH, TSHHS  Results for orders placed or performed in visit on 04/19/21   POCT Urinalysis no Micro   Result Value Ref Range    Color, UA yellow     Clarity, UA clear     Glucose, UA POC Negative     Bilirubin, UA Negative     Ketones, UA Negative     Spec Grav, UA 1.025     Blood, UA POC Negative     pH, UA 6.0     Protein, UA POC Negative     Urobilinogen, UA 0.2 E.U./dL     Leukocytes, UA Negative     Nitrite, UA Negative          ASSESSMENT/PLAN:      1.  Acute bilateral low back pain without sciatica  - methylPREDNISolone (MEDROL, WERO,) 4 MG tablet; Take by mouth. Dispense: 1 kit; Refill: 0  - tiZANidine (ZANAFLEX) 2 MG tablet; Take 1 tablet by mouth 3 times daily as needed (back pain)  Dispense: 20 tablet; Refill: 0    2. Right groin pain  - methylPREDNISolone (MEDROL, WERO,) 4 MG tablet; Take by mouth. Dispense: 1 kit; Refill: 0  - tiZANidine (ZANAFLEX) 2 MG tablet; Take 1 tablet by mouth 3 times daily as needed (back pain)  Dispense: 20 tablet; Refill: 0    3. Pain in right testicle  Urinalysis normal. Will send for urine culture. Will call with results. Will treat the back pain and see if the pain to the testicle and groin is referred pain from the back. - POCT Urinalysis no Micro  - methylPREDNISolone (MEDROL, WERO,) 4 MG tablet; Take by mouth. Dispense: 1 kit; Refill: 0  - tiZANidine (ZANAFLEX) 2 MG tablet; Take 1 tablet by mouth 3 times daily as needed (back pain)  Dispense: 20 tablet; Refill: 0            There are no discontinued medications. Care discussed with patient. Questions answered. Patient verbalizes understanding and agrees with plan. After visit summary provided. Advised to call for any problems, questions, or concerns. Return if symptoms worsen or fail to improve.                                              Signed:  HERACLIO Monae CNP  04/20/21  4:19 PM

## 2021-04-19 NOTE — TELEPHONE ENCOUNTER
Pt left VM on MA line, c/o back pain and requesting appt. Spoke with pt and informed PCP does not have any openings. Referred pt to [facility], and provided number. Pt voiced understanding/thanks. No further action is needed, at this time.

## 2021-04-20 ASSESSMENT — ENCOUNTER SYMPTOMS
VOMITING: 0
WHEEZING: 0
NAUSEA: 0
SHORTNESS OF BREATH: 0
SINUS PAIN: 0
SINUS PRESSURE: 0
RHINORRHEA: 0
SORE THROAT: 0
DIARRHEA: 0
COUGH: 0
CHEST TIGHTNESS: 0

## 2021-04-21 LAB
CULTURE: NORMAL
Lab: NORMAL
SPECIMEN: NORMAL

## 2021-04-26 ENCOUNTER — TELEPHONE (OUTPATIENT)
Dept: INTERNAL MEDICINE CLINIC | Age: 61
End: 2021-04-26

## 2021-04-26 ENCOUNTER — OFFICE VISIT (OUTPATIENT)
Dept: FAMILY MEDICINE CLINIC | Age: 61
End: 2021-04-26
Payer: COMMERCIAL

## 2021-04-26 ENCOUNTER — HOSPITAL ENCOUNTER (OUTPATIENT)
Dept: GENERAL RADIOLOGY | Age: 61
Discharge: HOME | End: 2021-04-26
Payer: COMMERCIAL

## 2021-04-26 ENCOUNTER — HOSPITAL ENCOUNTER (OUTPATIENT)
Age: 61
Discharge: HOME | End: 2021-04-26
Payer: COMMERCIAL

## 2021-04-26 VITALS
OXYGEN SATURATION: 97 % | SYSTOLIC BLOOD PRESSURE: 132 MMHG | TEMPERATURE: 97.2 F | DIASTOLIC BLOOD PRESSURE: 74 MMHG | HEART RATE: 84 BPM | WEIGHT: 222.2 LBS | BODY MASS INDEX: 31.11 KG/M2 | HEIGHT: 71 IN

## 2021-04-26 DIAGNOSIS — M54.50 ACUTE LEFT-SIDED LOW BACK PAIN WITHOUT SCIATICA: ICD-10-CM

## 2021-04-26 DIAGNOSIS — M25.551 RIGHT HIP PAIN: Primary | ICD-10-CM

## 2021-04-26 DIAGNOSIS — R10.31 RIGHT GROIN PAIN: ICD-10-CM

## 2021-04-26 DIAGNOSIS — M25.551 RIGHT HIP PAIN: ICD-10-CM

## 2021-04-26 PROCEDURE — 99213 OFFICE O/P EST LOW 20 MIN: CPT | Performed by: NURSE PRACTITIONER

## 2021-04-26 PROCEDURE — 72100 X-RAY EXAM L-S SPINE 2/3 VWS: CPT

## 2021-04-26 PROCEDURE — 73502 X-RAY EXAM HIP UNI 2-3 VIEWS: CPT

## 2021-04-26 NOTE — PROGRESS NOTES
Yury Section   62 y.o.  male  [id number]      Chief Complaint   Patient presents with    Back Pain        Subjective:  61 y.o.male is here for a follow up. He has the following chronic/acute medical problems:  Patient Active Problem List   Diagnosis    Primary insomnia    Hyperlipidemia    Herpes labialis    Glaucoma    Mild episode of recurrent major depressive disorder ([facility] [address]       Patient is here with complaints of back pain, right hip pain, and right groin pain. Patient was seen here on 4/19 and states the symptoms have not improved. Patient was prescribed at that time a medrol dose pack and Zanaflex. Patient states he has not had any imaging done. Patient states a couple times he has felt like his right hip is giving out. Patient states he continues with the right groin pain if he pushes on the area. Patient denies any pain in his right testicle today. Review of Systems   Constitutional: Negative for appetite change, chills, fatigue and fever. HENT: Negative for congestion, ear pain, postnasal drip, rhinorrhea, sinus pressure, sinus pain, sneezing and sore throat. Respiratory: Negative for cough, chest tightness, shortness of breath and wheezing. Cardiovascular: Negative for chest pain and palpitations. Gastrointestinal: Negative for diarrhea, nausea and vomiting. Musculoskeletal: Positive for back pain. Right hip pain and right groin pain   Skin: Negative for rash. Neurological: Negative for dizziness, light-headedness and headaches.        Current Outpatient Medications   Medication Sig Dispense Refill    zolpidem (AMBIEN) 5 MG tablet       acetaminophen (TYLENOL) 325 MG tablet Take 650 mg by mouth every 6 hours as needed for Pain      ibuprofen (ADVIL;MOTRIN) 200 MG tablet Take 200 mg by mouth every 6 hours as needed for Pain      tiZANidine (ZANAFLEX) 2 MG tablet Take 1 tablet by mouth 3 times daily as needed (back pain) 20 tablet 0    sertraline (ZOLOFT) 100 MG tablet Take 1 tablet by mouth 2 times daily 180 tablet 1    valACYclovir (VALTREX) 1 g tablet Take 1 tablet by mouth daily (Patient not taking: Reported on 3/11/2021) 30 tablet 3     No current facility-administered medications for this visit. Past medical, family,surgical history reviewed today. Objective:  /74   Pulse 84   Temp 97.2 °F (36.2 °C)   Ht 5' 11\" (1.803 m)   Wt 222 lb 3.2 oz (100.8 kg)   SpO2 97%   BMI 30.99 kg/m²   BP Readings from Last 3 Encounters:   04/26/21 132/74   04/19/21 122/78   03/11/21 130/70     Wt Readings from Last 3 Encounters:   04/26/21 222 lb 3.2 oz (100.8 kg)   04/19/21 220 lb (99.8 kg)   03/11/21 224 lb (101.6 kg)         Physical Exam  Constitutional:       Appearance: Normal appearance. HENT:      Head: Normocephalic. Neck:      Musculoskeletal: Neck supple. Cardiovascular:      Rate and Rhythm: Normal rate and regular rhythm. Heart sounds: Normal heart sounds. Pulmonary:      Effort: Pulmonary effort is normal.      Breath sounds: Normal breath sounds. Musculoskeletal:      Right hip: He exhibits decreased range of motion (due to pain) and decreased strength. He exhibits no tenderness, no bony tenderness and no swelling. Lumbar back: He exhibits decreased range of motion (due to pain) and pain. He exhibits no tenderness, no bony tenderness and no swelling. Skin:     General: Skin is warm and dry. Neurological:      Mental Status: He is alert and oriented to person, place, and time.    Psychiatric:         Mood and Affect: Mood normal.         Behavior: Behavior normal.         Lab Results   Component Value Date    WBC 10.5 08/19/2020    HGB 14.5 08/19/2020    HCT 42.9 08/19/2020    MCV 91.1 08/19/2020     08/19/2020     Lab Results   Component Value Date     08/19/2020    K 4.5 08/19/2020    CL 98 08/19/2020    CO2 27 08/19/2020    BUN 17 08/19/2020    CREATININE 1.0 08/19/2020    GLUCOSE 88 08/19/2020    CALCIUM 9.2 08/19/2020    PROT 6.6 08/31/2011    LABALBU 4.3 08/19/2020    BILITOT 0.5 08/19/2020    ALKPHOS 53 08/19/2020    AST 18 08/19/2020    ALT 19 08/19/2020    LABGLOM 67 10/24/2018    GFRAA >60 08/31/2011     Lab Results   Component Value Date    CHOL 280 08/19/2020    CHOL 167 10/24/2018    CHOL 224 (H) 10/22/2013     Lab Results   Component Value Date    TRIG 186 08/19/2020    TRIG 109 10/24/2018    TRIG 130 10/22/2013     Lab Results   Component Value Date    HDL 30 (A) 08/19/2020    HDL 29 (A) 10/24/2018    HDL 32 (L) 10/22/2013     Lab Results   Component Value Date    LDLCALC 116 10/24/2018    LDLCALC 166 (H) 10/22/2013     No results found for: LABA1C  No results found for: TSHFT4, TSH, TSHHS      ASSESSMENT/PLAN:      1. Right hip pain  Continue Ibuprofen 600 mg Q 6 hours as needed for pain. - XR HIP 2-3 VW W PELVIS RIGHT; Future    2. Acute left-sided low back pain without sciatica  See above. - XR LUMBAR SPINE (2-3 VIEWS); Future    3. Right groin pain  See above. No orders of the defined types were placed in this encounter. There are no discontinued medications. Care discussed with patient. Questions answered. Patient verbalizes understanding and agrees with plan. After visit summary provided. Advised to call for any problems, questions, or concerns. Return if symptoms worsen or fail to improve.                                              Signed:  HERACLIO Oakley CNP  05/04/21  12:28 PM

## 2021-04-26 NOTE — TELEPHONE ENCOUNTER
Pt called, he was seen at [facility] on 4/19/21. Rx'd Medrol Andres, Zanaflex. Pt reports no improvement. He is requesting XR. States his hip \"gave out\" on him 3x in the last week. Pt directed to return to [facility], for most immediate [address]. Advised pt to call office, if [address] still do not resolve.

## 2021-04-27 DIAGNOSIS — M89.8X5 BONE ISLAND OF RIGHT FEMUR: Primary | ICD-10-CM

## 2021-05-03 ENCOUNTER — HOSPITAL ENCOUNTER (OUTPATIENT)
Dept: CT IMAGING | Age: 61
Discharge: HOME | End: 2021-05-03
Payer: COMMERCIAL

## 2021-05-03 DIAGNOSIS — M89.8X5 BONE ISLAND OF RIGHT FEMUR: ICD-10-CM

## 2021-05-03 PROCEDURE — 73700 CT LOWER EXTREMITY W/O DYE: CPT

## 2021-05-04 ASSESSMENT — ENCOUNTER SYMPTOMS
COUGH: 0
CHEST TIGHTNESS: 0
SHORTNESS OF BREATH: 0
SINUS PRESSURE: 0
RHINORRHEA: 0
NAUSEA: 0
SINUS PAIN: 0
BACK PAIN: 1
WHEEZING: 0
VOMITING: 0
DIARRHEA: 0
SORE THROAT: 0

## 2021-05-07 ENCOUNTER — OFFICE VISIT (OUTPATIENT)
Dept: INTERNAL MEDICINE CLINIC | Age: 61
End: 2021-05-07
Payer: COMMERCIAL

## 2021-05-07 VITALS
BODY MASS INDEX: 30.96 KG/M2 | DIASTOLIC BLOOD PRESSURE: 68 MMHG | HEART RATE: 73 BPM | SYSTOLIC BLOOD PRESSURE: 110 MMHG | OXYGEN SATURATION: 96 % | TEMPERATURE: 97.3 F | WEIGHT: 222 LBS

## 2021-05-07 DIAGNOSIS — M54.50 ACUTE RIGHT-SIDED LOW BACK PAIN WITHOUT SCIATICA: ICD-10-CM

## 2021-05-07 DIAGNOSIS — R10.31 RIGHT GROIN PAIN: ICD-10-CM

## 2021-05-07 DIAGNOSIS — R59.0 INGUINAL LYMPHADENOPATHY: Primary | ICD-10-CM

## 2021-05-07 DIAGNOSIS — R10.30 LOWER ABDOMINAL PAIN: ICD-10-CM

## 2021-05-07 PROCEDURE — 99214 OFFICE O/P EST MOD 30 MIN: CPT | Performed by: FAMILY MEDICINE

## 2021-05-07 RX ORDER — HYDROCODONE BITARTRATE AND ACETAMINOPHEN 5; 325 MG/1; MG/1
1 TABLET ORAL EVERY 4 HOURS PRN
Qty: 18 TABLET | Refills: 0 | Status: SHIPPED | OUTPATIENT
Start: 2021-05-07 | End: 2021-05-10

## 2021-05-07 ASSESSMENT — ENCOUNTER SYMPTOMS
CHEST TIGHTNESS: 0
NAUSEA: 0
CONSTIPATION: 0
BACK PAIN: 1
SHORTNESS OF BREATH: 0
DIARRHEA: 0
COUGH: 0

## 2021-05-07 NOTE — PROGRESS NOTES
Sirisha Garcia  1960  62 y.o.  male    Chief Complaint   Patient presents with    Follow-up     Pt would like to discuss CT scan results. History of Present Illness  Sirisha Garcia is a 62 y.o. male who presents today for a check up after he was seen at the walk in clinic last month. -Pt here to follow up for low back pain. Pt states his leg gave out and he fell. He had acute  back pain and R groin pain. He has used Medrol dose ambar and Tizanidine. He has degenerative change of bilateral hips. Small bone islands of R femoral neck and acetabulum. Multilevel degenerative change of lumbar spine. Trace free fluid in pelvis. Mildly enlarged inguinal LN on CT. He continues to have some pain in lower to mid abdomen. Symptoms persist in lower back     Review of Systems   Constitutional: Negative for diaphoresis and fever. Respiratory: Negative for cough, chest tightness and shortness of breath. Cardiovascular: Negative for chest pain and palpitations. Gastrointestinal: Positive for abdominal pain. Negative for constipation, diarrhea and nausea. Genitourinary: Negative for difficulty urinating. Musculoskeletal: Positive for arthralgias and back pain. Neurological: Negative for dizziness and headaches. Psychiatric/Behavioral: Negative for dysphoric mood. Allergies   Allergen Reactions    Demerol     Simvastatin      Cramps       Past Medical History:   Diagnosis Date    Depression     Exposure to viral disease     GERD with esophagitis     Glaucoma     Herpes labialis     Hyperlipidemia     Insomnia     Mild episode of recurrent major depressive disorder (HCC)     Osteoarthritis     Right sacral radiculopathy     Tarsal tunnel syndrome        Past Surgical History:   Procedure Laterality Date    COLONOSCOPY  04/09/2018    colon polyps, divertcula, Hem- Dr Ishan Leal         History reviewed. No pertinent family history.     Social History     Tobacco Use    Tenderness: There is abdominal tenderness (discomfort lower to mid abdomen). There is no guarding or rebound. Musculoskeletal:         General: Tenderness (R lower back) present. Right lower leg: No edema. Left lower leg: No edema. Skin:     Findings: No rash. Neurological:      Mental Status: He is alert and oriented to person, place, and time. Cranial Nerves: No cranial nerve deficit. Sensory: No sensory deficit. Motor: No weakness. Psychiatric:         Mood and Affect: Mood normal.         ASSESSMENT:  1. Inguinal lymphadenopathy    2. Acute right-sided low back pain without sciatica    3. Right groin pain    4. Lower abdominal pain        PLAN:    Orders Placed This Encounter   Procedures    CT ABDOMEN PELVIS W IV CONTRAST Additional Contrast? Radiologist Recommendation    CBC Auto Differential    Comprehensive Metabolic Panel    Urinalysis with Microscopic       Orders Placed This Encounter   Medications    HYDROcodone-acetaminophen (NORCO) 5-325 MG per tablet     Sig: Take 1 tablet by mouth every 4 hours as needed for Pain for up to 3 days. Intended supply: 3 days. Take lowest dose possible to manage pain     Dispense:  18 tablet     Refill:  0     Reduce doses taken as pain becomes manageable   Per orders  Oarrs checked  Rx [facility]  Persist RTO or call. Worse to ER         Return if symptoms worsen or fail to improve.     Electronically Signed by Mekhi Bradshaw DO

## 2021-05-14 ENCOUNTER — HOSPITAL ENCOUNTER (OUTPATIENT)
Dept: CT IMAGING | Age: 61
Discharge: HOME | End: 2021-05-14
Payer: COMMERCIAL

## 2021-05-14 DIAGNOSIS — R10.31 RIGHT GROIN PAIN: ICD-10-CM

## 2021-05-14 DIAGNOSIS — R59.0 INGUINAL LYMPHADENOPATHY: ICD-10-CM

## 2021-05-14 DIAGNOSIS — R10.30 LOWER ABDOMINAL PAIN: ICD-10-CM

## 2021-05-14 LAB
ALBUMIN SERPL-MCNC: 4.3 G/DL
ALP BLD-CCNC: 50 U/L
ALT SERPL-CCNC: 19 U/L
ANION GAP SERPL CALCULATED.3IONS-SCNC: NORMAL MMOL/L
AST SERPL-CCNC: 20 U/L
BASOPHILS ABSOLUTE: 0.1 /ΜL
BASOPHILS RELATIVE PERCENT: 0.5 %
BILIRUB SERPL-MCNC: 0.4 MG/DL (ref 0.1–1.4)
BILIRUBIN, URINE: NEGATIVE
BLOOD, URINE: NEGATIVE
BUN BLDV-MCNC: 16 MG/DL
CALCIUM SERPL-MCNC: 9.5 MG/DL
CHLORIDE BLD-SCNC: 100 MMOL/L
CLARITY: CLEAR
CO2: 28 MMOL/L
COLOR: YELLOW
CREAT SERPL-MCNC: 1.2 MG/DL
EOSINOPHILS ABSOLUTE: 0.2 /ΜL
EOSINOPHILS RELATIVE PERCENT: 1.8 %
GFR AFRICAN AMERICAN: >60 ML/MIN/1.73M2
GFR CALCULATED: 65
GFR NON-AFRICAN AMERICAN: 55 ML/MIN/1.73M2
GLUCOSE BLD-MCNC: 99 MG/DL
GLUCOSE URINE: NEGATIVE
HCT VFR BLD CALC: 41.5 % (ref 41–53)
HEMOGLOBIN: 14.4 G/DL (ref 13.5–17.5)
KETONES, URINE: NEGATIVE
LEUKOCYTE ESTERASE, URINE: NEGATIVE
LYMPHOCYTES ABSOLUTE: 2.8 /ΜL
LYMPHOCYTES RELATIVE PERCENT: 22.5 %
MCH RBC QN AUTO: 30.1 PG
MCHC RBC AUTO-ENTMCNC: 34.7 G/DL
MCV RBC AUTO: 86.6 FL
MONOCYTES ABSOLUTE: 0.8 /ΜL
MONOCYTES RELATIVE PERCENT: 6.6 %
NEUTROPHILS ABSOLUTE: 8.5 /ΜL
NEUTROPHILS RELATIVE PERCENT: 68.3 %
NITRITE, URINE: NEGATIVE
PH UA: 6.5 (ref 4.5–8)
PLATELET # BLD: 224 K/ΜL
PMV BLD AUTO: 11.7 FL
POC CREATININE: 1.3 MG/DL (ref 0.9–1.3)
POTASSIUM SERPL-SCNC: 4.7 MMOL/L
PROTEIN UA: NEGATIVE
RBC # BLD: 4.79 10^6/ΜL
SODIUM BLD-SCNC: 136 MMOL/L
SPECIFIC GRAVITY, URINE: 1.05
TOTAL PROTEIN: 6.6
UROBILINOGEN, URINE: NORMAL
WBC # BLD: 12.5 10^3/ML

## 2021-05-14 PROCEDURE — 6360000004 HC RX CONTRAST MEDICATION: Performed by: FAMILY MEDICINE

## 2021-05-14 PROCEDURE — 74177 CT ABD & PELVIS W/CONTRAST: CPT

## 2021-05-14 PROCEDURE — 2580000003 HC RX 258: Performed by: FAMILY MEDICINE

## 2021-05-14 RX ORDER — SODIUM CHLORIDE 0.9 % (FLUSH) 0.9 %
10 SYRINGE (ML) INJECTION PRN
Status: DISCONTINUED | OUTPATIENT
Start: 2021-05-14 | End: 2021-05-15 | Stop reason: HOSPADM

## 2021-05-14 RX ADMIN — IOHEXOL 50 ML: 240 INJECTION, SOLUTION INTRATHECAL; INTRAVASCULAR; INTRAVENOUS; ORAL at 09:35

## 2021-05-14 RX ADMIN — IOPAMIDOL 75 ML: 755 INJECTION, SOLUTION INTRAVENOUS at 10:42

## 2021-05-14 RX ADMIN — SODIUM CHLORIDE, PRESERVATIVE FREE 10 ML: 5 INJECTION INTRAVENOUS at 10:40

## 2021-05-14 ASSESSMENT — ENCOUNTER SYMPTOMS: ABDOMINAL PAIN: 1

## 2021-05-18 ENCOUNTER — TELEPHONE (OUTPATIENT)
Dept: INTERNAL MEDICINE CLINIC | Age: 61
End: 2021-05-18

## 2021-05-18 DIAGNOSIS — M54.50 ACUTE RIGHT-SIDED LOW BACK PAIN WITHOUT SCIATICA: Primary | ICD-10-CM

## 2021-05-18 DIAGNOSIS — M25.551 RIGHT HIP PAIN: ICD-10-CM

## 2021-05-18 RX ORDER — LIDOCAINE 50 MG/G
1 PATCH TOPICAL DAILY
Qty: 30 PATCH | Refills: 0 | Status: SHIPPED | OUTPATIENT
Start: 2021-05-18 | End: 2021-06-10 | Stop reason: SDUPTHER

## 2021-05-18 RX ORDER — TIZANIDINE 4 MG/1
4 TABLET ORAL 2 TIMES DAILY PRN
Qty: 60 TABLET | Refills: 1 | Status: SHIPPED | OUTPATIENT
Start: 2021-05-18 | End: 2021-06-09

## 2021-05-24 NOTE — TELEPHONE ENCOUNTER
Spoke to pt previously. Addl testing/ referral ordered. Pt would like to increase the Tizanidine and the medication was sent in.  ADR's explained

## 2021-06-09 DIAGNOSIS — M54.50 ACUTE RIGHT-SIDED LOW BACK PAIN WITHOUT SCIATICA: ICD-10-CM

## 2021-06-09 RX ORDER — TIZANIDINE 4 MG/1
4 TABLET ORAL 2 TIMES DAILY PRN
Qty: 60 TABLET | Refills: 1 | Status: SHIPPED | OUTPATIENT
Start: 2021-06-09 | End: 2021-08-25

## 2021-06-10 ENCOUNTER — OFFICE VISIT (OUTPATIENT)
Dept: INTERNAL MEDICINE CLINIC | Age: 61
End: 2021-06-10
Payer: COMMERCIAL

## 2021-06-10 VITALS
WEIGHT: 218.8 LBS | DIASTOLIC BLOOD PRESSURE: 60 MMHG | HEART RATE: 74 BPM | OXYGEN SATURATION: 98 % | SYSTOLIC BLOOD PRESSURE: 100 MMHG | BODY MASS INDEX: 30.52 KG/M2

## 2021-06-10 DIAGNOSIS — F51.01 PRIMARY INSOMNIA: Primary | ICD-10-CM

## 2021-06-10 DIAGNOSIS — G89.29 CHRONIC RIGHT-SIDED LOW BACK PAIN WITHOUT SCIATICA: ICD-10-CM

## 2021-06-10 DIAGNOSIS — M54.50 CHRONIC RIGHT-SIDED LOW BACK PAIN WITHOUT SCIATICA: ICD-10-CM

## 2021-06-10 PROCEDURE — 99213 OFFICE O/P EST LOW 20 MIN: CPT | Performed by: FAMILY MEDICINE

## 2021-06-10 RX ORDER — ZOLPIDEM TARTRATE 5 MG/1
5 TABLET ORAL NIGHTLY PRN
Qty: 30 TABLET | Refills: 2 | Status: SHIPPED | OUTPATIENT
Start: 2021-06-10 | End: 2021-07-10

## 2021-06-10 RX ORDER — LIDOCAINE 50 MG/G
1 PATCH TOPICAL DAILY
Qty: 30 PATCH | Refills: 3 | Status: SHIPPED | OUTPATIENT
Start: 2021-06-10 | End: 2021-09-22

## 2021-06-10 SDOH — ECONOMIC STABILITY: TRANSPORTATION INSECURITY
IN THE PAST 12 MONTHS, HAS THE LACK OF TRANSPORTATION KEPT YOU FROM MEDICAL APPOINTMENTS OR FROM GETTING MEDICATIONS?: NO

## 2021-06-10 SDOH — ECONOMIC STABILITY: TRANSPORTATION INSECURITY
IN THE PAST 12 MONTHS, HAS LACK OF TRANSPORTATION KEPT YOU FROM MEETINGS, WORK, OR FROM GETTING THINGS NEEDED FOR DAILY LIVING?: NO

## 2021-06-10 SDOH — ECONOMIC STABILITY: FOOD INSECURITY: WITHIN THE PAST 12 MONTHS, YOU WORRIED THAT YOUR FOOD WOULD RUN OUT BEFORE YOU GOT MONEY TO BUY MORE.: NEVER TRUE

## 2021-06-10 SDOH — ECONOMIC STABILITY: FOOD INSECURITY: WITHIN THE PAST 12 MONTHS, THE FOOD YOU BOUGHT JUST DIDN'T LAST AND YOU DIDN'T HAVE MONEY TO GET MORE.: NEVER TRUE

## 2021-06-10 ASSESSMENT — SOCIAL DETERMINANTS OF HEALTH (SDOH): HOW HARD IS IT FOR YOU TO PAY FOR THE VERY BASICS LIKE FOOD, HOUSING, MEDICAL CARE, AND HEATING?: NOT HARD AT ALL

## 2021-06-10 ASSESSMENT — ENCOUNTER SYMPTOMS
COUGH: 0
ABDOMINAL PAIN: 0
CHEST TIGHTNESS: 0
SHORTNESS OF BREATH: 0
NAUSEA: 0

## 2021-06-10 NOTE — PROGRESS NOTES
Edy Iraheta  1960  62 y.o.  male    Chief Complaint   Patient presents with    3 Month Follow-Up    Insomnia         History of Present Illness  Edy Iraheta is a 62 y.o. male who presents today for a check up. Patient Active Problem List    Diagnosis Date Noted    Glaucoma     Mild episode of recurrent major depressive disorder ([facility] [address]     Hyperlipidemia 12/07/2020    Herpes labialis     Primary insomnia 08/04/2020     -Insomnia- Stable  -Chronic LBP with R sciatica- Stable today. He states he did not follow up with Ortho for his hip as he missed the appt. Review of Systems   Constitutional: Negative for diaphoresis and fever. Respiratory: Negative for cough, chest tightness and shortness of breath. Cardiovascular: Negative for chest pain and palpitations. Gastrointestinal: Negative for abdominal pain and nausea. Genitourinary: Negative for difficulty urinating. Musculoskeletal: Positive for back pain (chronic- stable). Neurological: Negative for dizziness and headaches. Psychiatric/Behavioral: Negative for dysphoric mood. Allergies   Allergen Reactions    Demerol     Simvastatin      Cramps       Past Medical History:   Diagnosis Date    Depression     Exposure to viral disease     GERD with esophagitis     Glaucoma     Herpes labialis     Hyperlipidemia     Insomnia     Mild episode of recurrent major depressive disorder (HCC)     Osteoarthritis     Right sacral radiculopathy     Tarsal tunnel syndrome        Past Surgical History:   Procedure Laterality Date    COLONOSCOPY  04/09/2018    colon polyps, divertcula, Hem- Dr Mely De Anda         History reviewed. No pertinent family history.     Social History     Tobacco Use    Smoking status: Never Smoker    Smokeless tobacco: Never Used   Substance Use Topics    Alcohol use: Never    Drug use: Yes     Types: Marijuana       Current Outpatient Medications   Medication Sig Dispense Refill  lidocaine (LIDODERM) 5 % Place 1 patch onto the skin daily 12 hours on, 12 hours off. 30 patch 3    zolpidem (AMBIEN) 5 MG tablet Take 1 tablet by mouth nightly as needed for Sleep for up to 30 days. 30 tablet 2    tiZANidine (ZANAFLEX) 4 MG tablet TAKE 1 TABLET BY MOUTH 2 TIMES DAILY AS NEEDED (MUSCLE PAIN) 60 tablet 1    acetaminophen (TYLENOL) 325 MG tablet Take 650 mg by mouth every 6 hours as needed for Pain      ibuprofen (ADVIL;MOTRIN) 200 MG tablet Take 200 mg by mouth every 6 hours as needed for Pain      sertraline (ZOLOFT) 100 MG tablet Take 1 tablet by mouth 2 times daily 180 tablet 1    valACYclovir (VALTREX) 1 g tablet Take 1 tablet by mouth daily 30 tablet 3     No current facility-administered medications for this visit. OBJECTIVE:    /60   Pulse 74   Wt 218 lb 12.8 oz (99.2 kg)   SpO2 98%   BMI 30.52 kg/m²     Physical Exam  Vitals reviewed. Constitutional:       General: He is not in acute distress. Eyes:      Conjunctiva/sclera: Conjunctivae normal.   Cardiovascular:      Rate and Rhythm: Normal rate and regular rhythm. Pulmonary:      Effort: Pulmonary effort is normal. No respiratory distress. Breath sounds: Normal breath sounds. Abdominal:      General: Bowel sounds are normal.      Palpations: Abdomen is soft. Tenderness: There is no abdominal tenderness. Musculoskeletal:      Cervical back: Neck supple. Right lower leg: No edema. Left lower leg: No edema. Neurological:      Mental Status: He is alert and oriented to person, place, and time. Motor: No weakness. Gait: Gait normal.         ASSESSMENT:  1. Primary insomnia    2. Chronic right-sided low back pain without sciatica        PLAN:    Orders Placed This Encounter   Medications    lidocaine (LIDODERM) 5 %     Sig: Place 1 patch onto the skin daily 12 hours on, 12 hours off.      Dispense:  30 patch     Refill:  3    zolpidem (AMBIEN) 5 MG tablet     Sig: Take 1 tablet by mouth nightly as needed for Sleep for up to 30 days. Dispense:  30 tablet     Refill:  2   Oarrs checked  Continue medications  Xray lumbar, CT ab/pelvis, CT hip reviewed  Declines PT at this time  Declined Covid 19 vaccine  Total time of encounter 20 min       Return in about 3 months (around 9/10/2021) for insomnia.     Electronically Signed by Magy Aquino DO

## 2021-06-17 ASSESSMENT — ENCOUNTER SYMPTOMS: BACK PAIN: 1

## 2021-06-22 ENCOUNTER — TELEPHONE (OUTPATIENT)
Dept: INTERNAL MEDICINE CLINIC | Age: 61
End: 2021-06-22

## 2021-06-22 DIAGNOSIS — M54.50 CHRONIC RIGHT-SIDED LOW BACK PAIN WITHOUT SCIATICA: Primary | ICD-10-CM

## 2021-06-22 DIAGNOSIS — G89.29 CHRONIC RIGHT-SIDED LOW BACK PAIN WITHOUT SCIATICA: Primary | ICD-10-CM

## 2021-06-22 NOTE — TELEPHONE ENCOUNTER
patient called he had been referred to physical therapy and did not want to go at that time for his back. Can a new referral be sent. They do not have the  referral anymore.

## 2021-06-25 ENCOUNTER — HOSPITAL ENCOUNTER (OUTPATIENT)
Dept: PHYSICAL THERAPY | Age: 61
Setting detail: THERAPIES SERIES
Discharge: HOME | End: 2021-06-25
Payer: COMMERCIAL

## 2021-06-25 PROCEDURE — 97162 PT EVAL MOD COMPLEX 30 MIN: CPT

## 2021-06-25 PROCEDURE — 97110 THERAPEUTIC EXERCISES: CPT

## 2021-06-25 PROCEDURE — 97140 MANUAL THERAPY 1/> REGIONS: CPT

## 2021-06-25 ASSESSMENT — PAIN DESCRIPTION - PROGRESSION: CLINICAL_PROGRESSION: GRADUALLY WORSENING

## 2021-06-25 ASSESSMENT — PAIN DESCRIPTION - ORIENTATION: ORIENTATION: RIGHT

## 2021-06-25 ASSESSMENT — PAIN - FUNCTIONAL ASSESSMENT: PAIN_FUNCTIONAL_ASSESSMENT: PREVENTS OR INTERFERES SOME ACTIVE ACTIVITIES AND ADLS

## 2021-06-25 ASSESSMENT — PAIN SCALES - GENERAL: PAINLEVEL_OUTOF10: 7

## 2021-06-25 ASSESSMENT — PAIN DESCRIPTION - DESCRIPTORS: DESCRIPTORS: ACHING;THROBBING;SHARP

## 2021-06-25 ASSESSMENT — PAIN DESCRIPTION - FREQUENCY: FREQUENCY: CONTINUOUS

## 2021-06-25 ASSESSMENT — PAIN DESCRIPTION - ONSET: ONSET: ON-GOING

## 2021-06-25 ASSESSMENT — PAIN DESCRIPTION - LOCATION: LOCATION: BACK

## 2021-06-25 ASSESSMENT — PAIN DESCRIPTION - PAIN TYPE: TYPE: CHRONIC PAIN

## 2021-06-25 NOTE — PROGRESS NOTES
Physical Therapy  Initial Assessment  Date: 2021  Patient Name: Demetra Dao  MRN: [id number]  : 1960     Treatment Diagnosis: R hip pain and LBP, pelvic misalignment    Restrictions: none       Subjective   General  Chart Reviewed: Yes  Patient assessed for rehabilitation services?: Yes  Additional Pertinent Hx: OA  Referring Practitioner: Navid Davies DO  Referral Date : 21  Diagnosis: Chronic R-sided LBP w/o sciatica  Follows Commands: Within Functional Limits  PT Visit Information  PT Insurance Information: [employer] - 60 PCY  Subjective  Subjective: Pt has had chronic LBP for ~10 years and it has progressively gotten worse. He gets to the point where all he can do is lay in bed because of the pain and has to lay flat. Pt did not have any injury that he feels contributed to the pain \"just life\". Pt notes the pain is primarily R sided low back and is sharp in nature. He also notes that a lot of his pain is in the front of his hip by the joint. Denies radicular symptoms but notes pain in his R hip flexor region and a little tingling intermittent around his R knee. Pt notes the hardest thing is walking, is able to do his ADLs but with pain and compensation. Pain Screening  Patient Currently in Pain: Yes  Pain Assessment  Pain Assessment: 0-10  Pain Level: 7  Pain Type: Chronic pain  Pain Location: Back  Pain Orientation: Right  Pain Descriptors: Aching; Throbbing; Sharp  Pain Frequency: Continuous  Pain Onset: On-going  Clinical Progression: Gradually worsening  Functional Pain Assessment: Prevents or interferes some active activities and ADLs  Vital Signs  Patient Currently in Pain: Yes    Vision/Hearing  Vision  Vision: Within Functional Limits  Hearing  Hearing: Within functional limits    Orientation  Orientation  Overall Orientation Status: Within Normal Limits    Social/Functional History  Social/Functional History  ADL Assistance: Independent  Homemaking Assistance: Independent  Ambulation Assistance: Independent  Transfer Assistance: Independent    Objective     Observation/Palpation  Palpation: Pt has TTP over R greater trochanter and proximal IT band  Observation: gait: antalgic pattern with reduced stance time RLE. Sitting: alteral lean L to reduce WB on R side at tx start    AROM RLE (degrees)  RLE AROM: WNL  RLE General AROM: hip flex: 96 deg  AROM LLE (degrees)  LLE AROM : WNL  Spine  Lumbar: flex: 75% w/ pain in R hip. Ext: WFL reduced hip pain. BL SB and BL Rot: WFL all directions and sides  Joint Mobility  Spine: L posterior rotated innominate rotation with up slip    Strength RLE  Strength RLE: WFL  Strength LLE  Strength LLE: WFL  Strength Other  Other: reduced core strength/stability     Additional Measures  Flexibility: mild reduced hamstring in 90/90 position, reduced flexibility of R hip flexor complex  Special Tests: Scour: no clicking/catching and only pain with extreme IR, none with compression. Modified Obers: above // with floor on R  Sensation  Overall Sensation Status: WFL          Assessment   Conditions Requiring Skilled Therapeutic Intervention  Body structures, Functions, Activity limitations: Decreased functional mobility ; Decreased ROM; Decreased strength;Decreased high-level IADLs; Increased pain;Decreased posture;Decreased ADL status  Assessment: Pt is a 81-year-old male who presents to clinic with chronic LBP and R hip pain for past 10 years. Upon assessment, pt presents with mild impaired lumbar/spinal ROM, pelvic misalignment, increased tightness of hip flexors and IT band on R, impaired gait and increased LBP/ R hip pain. Pt would benefit from skilled therapy interventions to address listed impairments, progress toward goal completion and improve ADL/IADL status. PT also warranted to reduce risk for further injury or decline. Treatment Diagnosis: R hip pain and LBP, pelvic misalignment  Prognosis: Good  Decision Making: Medium Complexity  History: see above.  PLOF: independent  Exam: see above  Clinical Presentation: see above  Barriers to Learning: none. style: demo  REQUIRES PT FOLLOW UP: Yes  Treatment Initiated : yes on 6/25    Patient agrees with established plan of care and assisted in the development of their short term and long term goals. Patient had no adverse reaction with initial treatment and there are no barriers to learning. Demonstrates no mental or cognitive disorder.          Plan   Plan  Times per week: 2  Times per day: Daily  Plan weeks: 5  Specific instructions for Next Treatment: nustep, pelvic alignment, sacral mobs, core control/ stability, IT band rolling  Current Treatment Recommendations: Strengthening, IADL Training, Neuromuscular Re-education, Home Exercise Program, ROM, Safety Education & Training, Manual Therapy - Soft Tissue Mobilization, Balance Training, Patient/Caregiver Education & Training, Functional Mobility Training, Gait Training, Modalities, Pain Management, ADL/Self-care Training      OutComes Score   Oswestry: 24/50         Goals  Short term goals  Time Frame for Short term goals: 5 visits  Short term goal 1: Pt will be IND with HEP in order to maximzie recovery outside of clinic  Long term goals  Time Frame for Long term goals : 10 visits  Long term goal 1: Pt will improve modified obers on R to parallel or below with floor to show improved IT band length  Long term goal 2: Pt will present with neutral pelvic alignment to show improved resting pelvic position  Long term goal 3: Pt will improve Oswestry to 14/50 or lower to show [address] and subjective improvement  Long term goal 4: Pt will report overall improvement of condition by 50% or more  Patient Goals   Patient goals : reduce hip and back pain       Therapy Time: 3135 - 4250       Henrietta Cordova, PT, DPT

## 2021-06-25 NOTE — FLOWSHEET NOTE
Outpatient Physical Therapy  [place]           [x] Phone: [phone]   Fax: [phone]  [facility]           [] Phone: [phone]   Fax: [phone]        Physical Therapy Daily Treatment Note  Date:  2021    Patient Name:  Varinder De Souza    :  1960  MRN: [id number]  Restrictions/Precautions:  none  Diagnosis:   Diagnosis: Chronic R-sided LBP w/o sciatica  Date of Injury/Surgery:   Treatment Diagnosis: Treatment Diagnosis: R hip pain and LBP, pelvic misalignment    Insurance/Certification information: PT Insurance Information: UMR - 61 PCY   Referring Physician:  Referring Practitioner: DO Andrzej Mckenna Doctor Visit:    Plan of care signed (Y/N): sent   Outcome Measure: Oswestry:   Visit# / total visits:   1/10  Pain level: 7/10     Goals:     Patient goals : reduce hip and back pain  Short term goals  Time Frame for Short term goals: 5 visits  Short term goal 1: Pt will be IND with HEP in order to maximzie recovery outside of clinic  Long term goals  Time Frame for Long term goals : 10 visits  Long term goal 1: Pt will improve modified obers on R to parallel or below with floor to show improved IT band length  Long term goal 2: Pt will present with neutral pelvic alignment to show improved resting pelvic position  Long term goal 3: Pt will improve Oswestry to 14/50 or lower to show [address] and subjective improvement  Long term goal 4: Pt will report overall improvement of condition by 50% or more    Summary of Evaluation: Assessment: Pt is a 21-year-old male who presents to clinic with chronic LBP and R hip pain for past 10 years. Upon assessment, pt presents with mild impaired lumbar/spinal ROM, pelvic misalignment, increased tightness of hip flexors and IT band on R, impaired gait and increased LBP/ R hip pain. Pt would benefit from skilled therapy interventions to address listed impairments, progress toward goal completion and improve ADL/IADL status.  PT also warranted x1      Next Progress Note due:  10th visit    Plan of Care Interventions:  [x] Therapeutic Exercise  [x] Modalities:  [x] Therapeutic Activity     [x] Ultrasound  [x] Estim  [x] Gait Training      [] Cervical Traction [x] Lumbar Traction  [x] Neuromuscular Re-education    [x] Cold/hotpack [] Iontophoresis   [x] Instruction in HEP      [x] Vasopneumatic   [] Dry Needling    [x] Manual Therapy               [] Aquatic Therapy              Electronically signed by:  Ivory Carter PT, DPT 6/25/2021, 3:58 PM

## 2021-06-25 NOTE — PLAN OF CARE
Outpatient Physical Therapy           [place]           [x] Phone: [phone]   Fax: [phone]  [facility]           [] Phone: [phone]   Fax: [phone]     To: Referring Practitioner: Vickey Duke DO   From: Isaiah Seo, PT, DPT     Patient: Inna Fu       : 1960  Diagnosis: Diagnosis: Chronic R-sided LBP w/o sciatica   Treatment Diagnosis: Treatment Diagnosis: R hip pain and LBP, pelvic misalignment   Date: 2021    Physical Therapy Certification/Re-Certification Form  Dear Dr. Chata Cook,  The following patient has been evaluated for physical therapy services and for therapy to continue, insurance requires physician review of the treatment plan initially and every 90 days. Please review the attached evaluation and/or summary of the patient's plan of care, and verify that you agree therapy should continue by signing the attached document and sending it back to our office. Assessment:    Assessment: Pt is a 28-year-old male who presents to clinic with chronic LBP and R hip pain for past 10 years. Upon assessment, pt presents with mild impaired lumbar/spinal ROM, pelvic misalignment, increased tightness of hip flexors and IT band on R, impaired gait and increased LBP/ R hip pain. Pt would benefit from skilled therapy interventions to address listed impairments, progress toward goal completion and improve ADL/IADL status. PT also warranted to reduce risk for further injury or decline. Patient agrees with established plan of care and assisted in the development of their short term and long term goals. Patient had no adverse reaction with initial treatment and there are no barriers to learning. Demonstrates no mental or cognitive disorder.      Plan of Care/Treatment to date:  [x] Therapeutic Exercise  [x] Modalities:  [x] Therapeutic Activity     [x] Ultrasound  [x] Electrical Stimulation  [x] Gait Training      [] Cervical Traction [x] Lumbar Traction  [x] Neuromuscular Re-education    [x] Cold/hotpack [] Iontophoresis   [x] Instruction in HEP      [x] Vasopneumatic    [x] Dry Needling  [x] Manual Therapy               [] Aquatic Therapy       Other:          Frequency/Duration:  # Days per week: [] 1 day # Weeks: [] 1 week [x] 5 weeks     [x] 2 days   [] 2 weeks [] 6 weeks     [] 3 days   [] 3 weeks [] 7 weeks     [] 4 days   [] 4 weeks [] 8 weeks         [] 9 weeks [] 10 weeks         [] 11 weeks [] 12 weeks    Rehab Potential/Progress: [] Excellent [x] Good [] Fair  [] Poor     Goals:    Patient goals : reduce hip and back pain  Short term goals  Time Frame for Short term goals: 5 visits  Short term goal 1: Pt will be IND with HEP in order to maximzie recovery outside of clinic  Long term goals  Time Frame for Long term goals : 10 visits  Long term goal 1: Pt will improve modified obers on R to parallel or below with floor to show improved IT band length  Long term goal 2: Pt will present with neutral pelvic alignment to show improved resting pelvic position  Long term goal 3: Pt will improve Oswestry to 14/50 or lower to show [address] and subjective improvement  Long term goal 4: Pt will report overall improvement of condition by 50% or more      Electronically signed by:  Jaleel Menezes PT, DPT, 6/25/2021, 1:19 PM        If you have any questions or concerns, please don't hesitate to call.   Thank you for your referral.      Physician Signature:________________________________Date:_________ TIME: _____  By signing above, therapists plan is approved by physician

## 2021-07-02 ENCOUNTER — HOSPITAL ENCOUNTER (OUTPATIENT)
Dept: PHYSICAL THERAPY | Age: 61
Setting detail: THERAPIES SERIES
Discharge: HOME | End: 2021-07-02
Payer: COMMERCIAL

## 2021-07-02 PROCEDURE — 97140 MANUAL THERAPY 1/> REGIONS: CPT

## 2021-07-02 PROCEDURE — 97112 NEUROMUSCULAR REEDUCATION: CPT

## 2021-07-02 NOTE — FLOWSHEET NOTE
Outpatient Physical Therapy  [place]           [x] Phone: [phone]   Fax: [phone]  [facility]           [] Phone: [phone]   Fax: [phone]        Physical Therapy Daily Treatment Note  Date:  2021    Patient Name:  Bharath Moeller    :  1960  MRN: [id number]  Restrictions/Precautions:  none  Diagnosis:   Diagnosis: Chronic R-sided LBP w/o sciatica  Date of Injury/Surgery:   Treatment Diagnosis: Treatment Diagnosis: R hip pain and LBP, pelvic misalignment    Insurance/Certification information: PT Insurance Information: [employer] - 61 PCY   Referring Physician:  Referring Practitioner: DO Andrzej Garnica Doctor Visit:    Plan of care signed (Y/N): sent   Outcome Measure: Oswestry:   Visit# / total visits:   2/10  Pain level: 3/10     Goals:     Patient goals : reduce hip and back pain  Short term goals  Time Frame for Short term goals: 5 visits  Short term goal 1: Pt will be IND with HEP in order to maximzie recovery outside of clinic   Long term goals  Time Frame for Long term goals : 10 visits  Long term goal 1: Pt will improve modified obers on R to parallel or below with floor to show improved IT band length  Long term goal 2: Pt will present with neutral pelvic alignment to show improved resting pelvic position  Long term goal 3: Pt will improve Oswestry to 14/50 or lower to show [address] and subjective improvement  Long term goal 4: Pt will report overall improvement of condition by 50% or more    Summary of Evaluation: Assessment: Pt is a 73-year-old male who presents to clinic with chronic LBP and R hip pain for past 10 years. Upon assessment, pt presents with mild impaired lumbar/spinal ROM, pelvic misalignment, increased tightness of hip flexors and IT band on R, impaired gait and increased LBP/ R hip pain. Pt would benefit from skilled therapy interventions to address listed impairments, progress toward goal completion and improve ADL/IADL status.  PT also warranted to reduce risk for further injury or decline. Subjective:  Mary Reese arrives to therapy stating that the back isn't feeling too bad this morning but hasn't done anything yet. Reports compliance with HEP and states that there is no issues with them so far. The pain he feels today is mostly in the groin, anterior hip, and down the anterior thigh. Any changes in Ambulatory Summary Sheet? None        Objective:  COVID screening questions were asked and patient attested that there had been no contact or symptoms    Posteriorly rotated on the R. Neutral sacral alignment. Good PPT. HS cramp with bridges. Tight hip flexors bilaterally. Exercises: (No more than 4 columns)   Exercise/Equipment 6/25/21 #1 7/2/2021 #2 Date           WARM UP      Nu-step   S13/L4 5' no UE          TABLE      PPT x10 2*10 5\"    bridge x10 2*10                         STANDING      Hip flex stretch 2x30\" R -    Palloff press   BlkTB 15* ea                                       PROPRIOCEPTION                                    MODALITIES                      Other Therapeutic Activities/Education:      Home Exercise Program: Issued, practiced and pt demo ability to perform 6/25/2021      Manual Treatments:   MET for R post rotated innominate w/ shot gun follow, l, S/L hip flexor stretch bilaterally, HS stretching bilaterally       Modalities:  none      Communication with other providers:        Assessment: Mary Reese tolerated today's session well. He responded nicely to the MET to correct his pelvic alignment. He is tight in hip flexors/rectus femoris bilaterally which is likely affecting his alignment.  End session pain:  2/10      Plan for Next Session:  nustep, pelvic alignment, sacral mobs, core control/ stability, IT band rolling      Time In / Time Out:    0700/0738      Timed Code/Total Treatment Minutes:  45' 1 man 15' 2 NR 23'      Next Progress Note due:  10th visit    Plan of Care Interventions:  [x] Therapeutic Exercise  [x] Modalities:  [x] Therapeutic Activity     [x] Ultrasound  [x] Estim  [x] Gait Training      [] Cervical Traction [x] Lumbar Traction  [x] Neuromuscular Re-education    [x] Cold/hotpack [] Iontophoresis   [x] Instruction in HEP      [x] Vasopneumatic   [] Dry Needling    [x] Manual Therapy               [] Aquatic Therapy              Electronically signed by:  Jessica Galan PTA     7/2/2021, 6:42 AM

## 2021-07-07 ENCOUNTER — HOSPITAL ENCOUNTER (OUTPATIENT)
Dept: PHYSICAL THERAPY | Age: 61
Setting detail: THERAPIES SERIES
Discharge: HOME | End: 2021-07-07
Payer: COMMERCIAL

## 2021-07-07 PROCEDURE — 97112 NEUROMUSCULAR REEDUCATION: CPT

## 2021-07-07 PROCEDURE — 97110 THERAPEUTIC EXERCISES: CPT

## 2021-07-07 PROCEDURE — 95851 RANGE OF MOTION MEASUREMENTS: CPT

## 2021-07-07 PROCEDURE — 97535 SELF CARE MNGMENT TRAINING: CPT

## 2021-07-07 NOTE — FLOWSHEET NOTE
Outpatient Physical Therapy  [place]           [x] Phone: [phone]   Fax: [phone]  [facility]           [] Phone: [phone]   Fax: [phone]        Physical Therapy Daily Treatment Note  Date:  2021    Patient Name:  Mainor Lafleur    :  1960  MRN: [id number]  Restrictions/Precautions:  none  Diagnosis:   Diagnosis: Chronic R-sided LBP w/o sciatica  Date of Injury/Surgery:   Treatment Diagnosis: Treatment Diagnosis: R hip pain and LBP, pelvic misalignment    Insurance/Certification information: PT Insurance Information: UMR - 61 PCY   Referring Physician:  Referring Practitioner: Brianna Norris DO  Next Doctor Visit:    Plan of care signed (Y/N): sent   Outcome Measure: Oswestry:   Visit# / total visits:   3/10  Pain level: 3/10     Goals:     Patient goals : reduce hip and back pain  Short term goals  Time Frame for Short term goals: 5 visits  Short term goal 1: Pt will be IND with HEP in order to maximzie recovery outside of clinic Reports compliance   Long term goals  Time Frame for Long term goals : 10 visits  Long term goal 1: Pt will improve modified obers on R to parallel or below with floor to show improved IT band length  Long term goal 2: Pt will present with neutral pelvic alignment to show improved resting pelvic position Not met   Long term goal 3: Pt will improve Oswestry to 14/50 or lower to show [address] and subjective improvement  Long term goal 4: Pt will report overall improvement of condition by 50% or more     Summary of Evaluation: Assessment: Pt is a 36-year-old male who presents to clinic with chronic LBP and R hip pain for past 10 years. Upon assessment, pt presents with mild impaired lumbar/spinal ROM, pelvic misalignment, increased tightness of hip flexors and IT band on R, impaired gait and increased LBP/ R hip pain.  Pt would benefit from skilled therapy interventions to address listed impairments, progress toward goal completion and improve ADL/IADL status. PT also warranted to reduce risk for further injury or decline. Subjective:   Pt stated that he had no pain yesterday until states he walked 1.5 mi. He states that he has improved flexibility and pain levels since starting PT. Reports 30% improvement overall. Would like to see a further decrease in the intensity of his pain levels for the remainder of his PT sessions. Patient states that he sits a lot at home since he has retired. Doesn't do a lot of lifting but does do laundry and occasionally lifts things like a case of water. Any changes in Ambulatory Summary Sheet? None        Objective:  COVID screening questions were asked and patient attested that there had been no contact or symptoms    R Posterior innominate, corrected easily with MET. HS cramping with bridges this date, added add squeeze to eliminate this. Exercises: (No more than 4 columns)   Exercise/Equipment 6/25/21 #1 7/2/2021 #2 7/7/2021 #3             WARM UP       Nu-step   S13/L4 5' no UE 5'           TABLE       PPT x10 2*10 5\" 2*10 5\"     bridge x10 2*10 2*10 3\" with add squeeze to reduce HS cramping    PPT with LE kick outs (1 leg supported at all times)    2*10 ea LE                             STANDING       Hip flex stretch 2x30\" R - -    Palloff press   BlkTB 15* ea BlkTB 15* ea    Shuttle kick back    1C 10*    Lateral band walk    BTB 60' ea dir  Mid shin                              PROPRIOCEPTION                                          MODALITIES                         Other Therapeutic Activities/Education: Education provided on getting up at least every 45 minutes throughout the day. Discussed either sitting in a different chair (not the recliner) or using a good lumbar support (pillow or towel) in his lumbar spine and sitting all the way back against the back rest. Body mechanics training performed.      Home Exercise Program: Issued, practiced and pt demo ability to perform 6/25/2021      Manual Treatments: R HS stretch, L hip flexor stretch, R ITB rolling       Modalities:  none      Communication with other providers:        Assessment: Zulema Mccallum continues to present to PT with misalignment of the pelvis; however, it does correct easily. He has a good working knowledge of body mechanics and performs activities in the clinic relative to lifting without need for cues. He is weak in his core and glut muscles and demonstrates some tightness in the hips. He will continue to benefit from skilled PT services in order to progress strength and flexibility as well as educate for home to avoid recurrence of pain and misalignment.  End session pain: 0/10      Plan for Next Session:  nustep, pelvic alignment, sacral mobs, core control/ stability, IT band rolling      Time In / Time Out:   1005/1100      Timed Code/Total Treatment Minutes:  54' 1 man 15' 1 NR 10' 1 ADL 10' 1 TE 20'      Next Progress Note due:  10th visit    Plan of Care Interventions:  [x] Therapeutic Exercise  [x] Modalities:  [x] Therapeutic Activity     [x] Ultrasound  [x] Estim  [x] Gait Training      [] Cervical Traction [x] Lumbar Traction  [x] Neuromuscular Re-education    [x] Cold/hotpack [] Iontophoresis   [x] Instruction in HEP      [x] Vasopneumatic   [] Dry Needling    [x] Manual Therapy               [] Aquatic Therapy              Electronically signed by:  Renetta Sanders PTA     7/7/2021, 8:18 AM

## 2021-07-09 ENCOUNTER — HOSPITAL ENCOUNTER (OUTPATIENT)
Dept: PHYSICAL THERAPY | Age: 61
Setting detail: THERAPIES SERIES
Discharge: HOME | End: 2021-07-09
Payer: COMMERCIAL

## 2021-07-09 PROCEDURE — 97112 NEUROMUSCULAR REEDUCATION: CPT

## 2021-07-09 PROCEDURE — 97140 MANUAL THERAPY 1/> REGIONS: CPT

## 2021-07-09 PROCEDURE — 97530 THERAPEUTIC ACTIVITIES: CPT

## 2021-07-09 NOTE — FLOWSHEET NOTE
Outpatient Physical Therapy  [place]           [x] Phone: [phone]   Fax: [phone]  [facility]           [] Phone: [phone]   Fax: [phone]        Physical Therapy Daily Treatment Note  Date:  2021    Patient Name:  Omega Herron    :  1960  MRN: [id number]  Restrictions/Precautions:  none  Diagnosis:   Diagnosis: Chronic R-sided LBP w/o sciatica  Date of Injury/Surgery:   Treatment Diagnosis: Treatment Diagnosis: R hip pain and LBP, pelvic misalignment    Insurance/Certification information: PT Insurance Information: UMR - 61 PCY   Referring Physician:  Referring Practitioner: DO Andrzej Germain Doctor Visit:    Plan of care signed (Y/N): sent   Outcome Measure: Oswestry:   Visit# / total visits:   4/10  Pain level: 2-3/10     Goals:     Patient goals : reduce hip and back pain  Short term goals  Time Frame for Short term goals: 5 visits  Short term goal 1: Pt will be IND with HEP in order to maximzie recovery outside of clinic Reports compliance   Long term goals  Time Frame for Long term goals : 10 visits  Long term goal 1: Pt will improve modified obers on R to parallel or below with floor to show improved IT band length  Long term goal 2: Pt will present with neutral pelvic alignment to show improved resting pelvic position Not met   Long term goal 3: Pt will improve Oswestry to 14/50 or lower to show [address] and subjective improvement  Long term goal 4: Pt will report overall improvement of condition by 50% or more     Summary of Evaluation: Assessment: Pt is a 81-year-old male who presents to clinic with chronic LBP and R hip pain for past 10 years. Upon assessment, pt presents with mild impaired lumbar/spinal ROM, pelvic misalignment, increased tightness of hip flexors and IT band on R, impaired gait and increased LBP/ R hip pain.  Pt would benefit from skilled therapy interventions to address listed impairments, progress toward goal completion and improve ADL/IADL status. PT also warranted to reduce risk for further injury or decline. Subjective:     Pt presents with hip pain at a 2-3/10. Pt was in good spirits and stated that he played pool last night and said it was the first time he has gone without pain since PT has started. Any changes in Ambulatory Summary Sheet? None        Objective:  COVID screening questions were asked and patient attested that there had been no contact or symptoms    Pt's allignment checked at beginning of session and had no signs of misalignment. Educated pt on hip flexor stretches that he could perform at home in standing and in supine. Pt had cramp in R hamstrings after first set of bridges. Performed man HS stretch on R leg and then proceded with therex. Exercises: (No more than 4 columns)   Exercise/Equipment 6/25/21 #1 7/2/2021 #2 7/7/2021 #3             WARM UP       Nu-step   S13/L4 5' no UE 5' S13/L4 5' no UE          TABLE       PPT x10 2*10 5\" 2*10 5\"  2*10 5\"    bridge x10 2*10 2*10 3\" with add squeeze to reduce HS cramping 2*10 3\" with add squeeze to reduce HS cramping   PPT with LE kick outs (1 leg supported at all times)    2*10 ea LE  2*10 ea LE                            STANDING       Hip flex stretch 2x30\" R - - --   Palloff press   BlkTB 15* ea BlkTB 15* ea BlkTB 15* ea   Shuttle kick back    1C 10* 1C 10*   Lateral band walk    BTB 60' ea dir  BTB 60' ea dir                              PROPRIOCEPTION                                          MODALITIES                         Other Therapeutic Activities/Education: Education provided on getting up at least every 45 minutes throughout the day. Discussed either sitting in a different chair (not the recliner) or using a good lumbar support (pillow or towel) in his lumbar spine and sitting all the way back against the back rest. Body mechanics training performed.      Home Exercise Program: Issued, practiced and pt demo ability to perform 6/25/2021      Manual Treatments: R HS stretch, R hip flexor stretch of edge of table       Modalities:  none      Communication with other providers:        Assessment: Matilde Woods presented with no misalligment of the pelvis and did not required adjustments. Pt shows good initiative and awareness of body mechanics and self corrected a few times during session. Pt continues to demonstrate tightness in the R hip, but is progressively dealing with less pain. Pt will benefit from continued PT address weak core strength and improve hip pain. End session pain: 1-2/10      Plan for Next Session:  nustep, pelvic alignment, sacral mobs, core control/ stability, IT band rolling      Time In / Time Out:    1796-6274      Timed Code/Total Treatment Minutes:   40'/40' 1 man 8' 1 NR 20' 1 TE 12'       Next Progress Note due:  10th visit    Plan of Care Interventions:  [x] Therapeutic Exercise  [x] Modalities:  [x] Therapeutic Activity     [x] Ultrasound  [x] Estim  [x] Gait Training      [] Cervical Traction [x] Lumbar Traction  [x] Neuromuscular Re-education    [x] Cold/hotpack [] Iontophoresis   [x] Instruction in HEP      [x] Vasopneumatic   [] Dry Needling    [x] Manual Therapy               [] Aquatic Therapy              Electronically signed by:  Pradeep Gilmore PTA     7/9/2021, 6:56 AM      Lito Hamm.  Criss Oseguera

## 2021-07-12 ENCOUNTER — HOSPITAL ENCOUNTER (OUTPATIENT)
Dept: PHYSICAL THERAPY | Age: 61
Setting detail: THERAPIES SERIES
Discharge: HOME | End: 2021-07-12
Payer: COMMERCIAL

## 2021-07-12 PROCEDURE — 97140 MANUAL THERAPY 1/> REGIONS: CPT

## 2021-07-12 PROCEDURE — 97110 THERAPEUTIC EXERCISES: CPT

## 2021-07-12 PROCEDURE — 97112 NEUROMUSCULAR REEDUCATION: CPT

## 2021-07-12 NOTE — FLOWSHEET NOTE
Outpatient Physical Therapy  [place]           [x] Phone: [phone]   Fax: [phone]  [facility]           [] Phone: [phone]   Fax: [phone]        Physical Therapy Daily Treatment Note  Date:  2021    Patient Name:  Davida Soriano    :  1960  MRN: [id number]  Restrictions/Precautions:  none  Diagnosis:   Diagnosis: Chronic R-sided LBP w/o sciatica  Date of Injury/Surgery:   Treatment Diagnosis: Treatment Diagnosis: R hip pain and LBP, pelvic misalignment    Insurance/Certification information: PT Insurance Information: UMR - 61 PCY   Referring Physician:  Referring Practitioner: Bandar Shi DO  Next Doctor Visit:    Plan of care signed (Y/N): sent   Outcome Measure: Oswestry:   Visit# / total visits:   5/10  Pain level: 2-3/10     Goals:     Patient goals : reduce hip and back pain  Short term goals  Time Frame for Short term goals: 5 visits  Short term goal 1: Pt will be IND with HEP in order to maximzie recovery outside of clinic Reports compliance   Long term goals  Time Frame for Long term goals : 10 visits  Long term goal 1: Pt will improve modified obers on R to parallel or below with floor to show improved IT band length  Long term goal 2: Pt will present with neutral pelvic alignment to show improved resting pelvic position Not met   Long term goal 3: Pt will improve Oswestry to 14/50 or lower to show [address] and subjective improvement  Long term goal 4: Pt will report overall improvement of condition by 50% or more     Summary of Evaluation: Assessment: Pt is a 72-year-old male who presents to clinic with chronic LBP and R hip pain for past 10 years. Upon assessment, pt presents with mild impaired lumbar/spinal ROM, pelvic misalignment, increased tightness of hip flexors and IT band on R, impaired gait and increased LBP/ R hip pain.  Pt would benefit from skilled therapy interventions to address listed impairments, progress toward goal completion and improve ADL/IADL status. PT also warranted to reduce risk for further injury or decline. Subjective:     Pt says that he is feeling good with hip pain only at a 2-3/10. Pt notes that he has been up and about doing housework lately. Pt stated that he felt like had overdone things at one point during the day yesterday and had increased pain. Pt stated that her took a 20 min nap and his pain \"went back to normal.\"     Any changes in Ambulatory Summary Sheet? None    Objective:  COVID screening questions were asked and patient attested that there had been no contact or symptoms    Pt's allignment checked at beginning of session and had no major signs of misalignment.  Increased shuttle kick back resistance to 2C at pt's request.       Exercises: (No more than 4 columns)   Exercise/Equipment 6/25/21 #1 7/2/2021 #2 7/7/2021 #3 7/9/21  #4 7/12/21  #5             WARM UP        Nu-step   S13/L4 5' no UE 5' S13/L4 5' no UE S13/L5 5' no UE           TABLE        PPT x10 2*10 5\" 2*10 5\"  2*10 5\"  2*10 8\"    bridge x10 2*10 2*10 3\" with add squeeze to reduce HS cramping 2*10 3\" with add squeeze to reduce HS cramping 2*15 3\" with add squeeze to reduce HS cramping   PPT with LE kick outs (1 leg supported at all times)    2*10 ea LE  2*10 ea LE  2*15 ea LE                               STANDING        Hip flex stretch 2x30\" R - - --    Palloff press   BlkTB 15* ea BlkTB 15* ea BlkTB 15* ea BlkTB 20* ea   Shuttle kick back    1C 10* 1C 10* 2C x 10   Lateral band walk    BTB 60' ea dir  BTB 60' ea dir  BTB 60' ea dir                                 PROPRIOCEPTION                                                MODALITIES                            Other Therapeutic Activities/Education:      Home Exercise Program: Issued, practiced and pt demo ability to perform 6/25/2021      Manual Treatments: R hip flexor stretch of edge of table       Modalities:  none      Communication with other providers:        Assessment: Pt tolerated session well today with no notable spasm or increase of pain during therex. Pt demonstrated increased strength and endurance with shuttle therex this session. End session pain: 1-2/10      Plan for Next Session:  nustep, pelvic alignment, sacral mobs, core control/ stability, IT band rolling      Time In / Time Out:    2937-9761      Timed Code/Total Treatment Minutes:   41'/41' 1 man 8' 1 NR 18' 1 TE 15'       Next Progress Note due:  10th visit    Plan of Care Interventions:  [x] Therapeutic Exercise  [x] Modalities:  [x] Therapeutic Activity     [x] Ultrasound  [x] Estim  [x] Gait Training      [] Cervical Traction [x] Lumbar Traction  [x] Neuromuscular Re-education    [x] Cold/hotpack [] Iontophoresis   [x] Instruction in HEP      [x] Vasopneumatic   [] Dry Needling    [x] Manual Therapy               [] Aquatic Therapy              Electronically signed by:  Soy Fink, AMANDA     7/12/2021, 9:44 AM      Eliseo Lawson.  Melodie Castellanos

## 2021-07-19 ENCOUNTER — HOSPITAL ENCOUNTER (OUTPATIENT)
Dept: PHYSICAL THERAPY | Age: 61
Setting detail: THERAPIES SERIES
Discharge: HOME | End: 2021-07-19
Payer: COMMERCIAL

## 2021-07-19 PROCEDURE — 97112 NEUROMUSCULAR REEDUCATION: CPT

## 2021-07-19 PROCEDURE — 97110 THERAPEUTIC EXERCISES: CPT

## 2021-07-19 NOTE — FLOWSHEET NOTE
Outpatient Physical Therapy  [place]           [x] Phone: [phone]   Fax: [phone]  [facility]           [] Phone: [phone]   Fax: [phone]        Physical Therapy Daily Treatment Note  Date:  2021    Patient Name:  Mainor Lafleur    :  1960  MRN: [id number]  Restrictions/Precautions:  none  Diagnosis:   Diagnosis: Chronic R-sided LBP w/o sciatica  Date of Injury/Surgery:   Treatment Diagnosis: Treatment Diagnosis: R hip pain and LBP, pelvic misalignment    Insurance/Certification information: PT Insurance Information: UMR - 61 PCY   Referring Physician:  Referring Practitioner: Brianna Norris DO  Next Doctor Visit:    Plan of care signed (Y/N): yes  Outcome Measure: Oswestry:   Visit# / total visits:   6/10  Pain level: 2/10     Goals:     Patient goals : reduce hip and back pain  Short term goals  Time Frame for Short term goals: 5 visits  Short term goal 1: Pt will be IND with HEP in order to maximzie recovery outside of clinic MET   Long term goals  Time Frame for Long term goals : 10 visits  Long term goal 1: Pt will improve modified obers on R to parallel or below with floor to show improved IT band length  Long term goal 2: Pt will present with neutral pelvic alignment to show improved resting pelvic position   Long term goal 3: Pt will improve Oswestry to 14/50 or lower to show [address] and subjective improvement  Long term goal 4: Pt will report overall improvement of condition by 50% or more     Summary of Evaluation: Assessment: Pt is a 52-year-old male who presents to clinic with chronic LBP and R hip pain for past 10 years. Upon assessment, pt presents with mild impaired lumbar/spinal ROM, pelvic misalignment, increased tightness of hip flexors and IT band on R, impaired gait and increased LBP/ R hip pain. Pt would benefit from skilled therapy interventions to address listed impairments, progress toward goal completion and improve ADL/IADL status.  PT also warranted to reduce risk for further injury or decline. Subjective:   Pt is doing well this date, he is a little sore/stiff in the back from having about a week off but not like before therapy and his R leg is doing much better. Any changes in Ambulatory Summary Sheet? None    Objective:  COVID screening questions were asked and patient attested that there had been no contact or symptoms    -neutral pelvic alignment this date    Exercises: (No more than 4 columns)   Exercise/Equipment 7/9/21  #4 7/12/21  #5 7/19/21 #6           WARM UP      Nu-step  S13/L4 5' no UE S13/L5 5' no UE L5 x5'         TABLE      PPT 2*10 5\"  2*10 8\"  X15, 5\"   bridge 2*10 3\" with add squeeze to reduce HS cramping 2*15 3\" with add squeeze to reduce HS cramping X10, 3\" less knee bend to prevent cramping   PPT with LE kick outs (1 leg supported at all times)  2*10 ea LE  2*15 ea LE                          STANDING      Hip flex stretch   2x30\" off edge of table w/ strap for more stretch   Palloff press  BlkTB 15* ea BlkTB 20* ea Blk TB x10 ea side   Trunk rotation   Blk TB x10 ea side   Shuttle kick back  1C 10* 2C x 10 1C x10 BL   Shuttle   3C BLE 2x10   Lateral band walk  BTB 60' ea dir  BTB 60' ea dir     STS   No UE focus on glute control with cues for reduced hip rotation x10   Hip abd machine   x10 ea BLE 25#              PROPRIOCEPTION                  MODALITIES                      Other Therapeutic Activities/Education: [facility]    Home Exercise Program: Issued, practiced and pt demo ability to perform 6/25/2021      Manual Treatments:       Modalities:  none      Communication with other providers:        Assessment: Pt tolerated today's treatment without any adverse reactions or complications this date. Pt has shown good improvement with tolerance to exercises and neutral pelvic alignment this date.  Pt would continue to benefit from skilled therapy interventions to address remaining impairments, improve mobility and strength and progress toward goal completion while reducing risk for re-injury or further decline.   End session pain: 0/10      Plan for Next Session:  nustep, pelvic alignment, sacral mobs, core control/ stability, IT band rolling      Time In / Time Out:  7222 - 7640    Timed Code/Total Treatment Minutes:  38': 11' NMR x1, 27' TE x2     Next Progress Note due:  10th visit    Plan of Care Interventions:  [x] Therapeutic Exercise  [x] Modalities:  [x] Therapeutic Activity     [x] Ultrasound  [x] Estim  [x] Gait Training      [] Cervical Traction [x] Lumbar Traction  [x] Neuromuscular Re-education    [x] Cold/hotpack [] Iontophoresis   [x] Instruction in HEP      [x] Vasopneumatic   [] Dry Needling    [x] Manual Therapy               [] Aquatic Therapy              Electronically signed by:  Bee Aguilar PT, DPT     7/19/2021, 8:55 AM

## 2021-07-22 ENCOUNTER — HOSPITAL ENCOUNTER (OUTPATIENT)
Dept: PHYSICAL THERAPY | Age: 61
Setting detail: THERAPIES SERIES
Discharge: HOME | End: 2021-07-22
Payer: COMMERCIAL

## 2021-07-22 PROCEDURE — 97112 NEUROMUSCULAR REEDUCATION: CPT

## 2021-07-22 PROCEDURE — 97110 THERAPEUTIC EXERCISES: CPT

## 2021-07-22 NOTE — FLOWSHEET NOTE
Outpatient Physical Therapy  [place]           [x] Phone: [phone]   Fax: [phone]  [facility]           [] Phone: [phone]   Fax: [phone]        Physical Therapy Daily Treatment Note  Date:  2021    Patient Name:  Davida Soriano    :  1960  MRN: [id number]  Restrictions/Precautions:  none  Diagnosis:   Diagnosis: Chronic R-sided LBP w/o sciatica  Date of Injury/Surgery:   Treatment Diagnosis: Treatment Diagnosis: R hip pain and LBP, pelvic misalignment    Insurance/Certification information: PT Insurance Information: UMR - 61 PCY   Referring Physician:  Referring Practitioner: Bandar Shi DO  Next Doctor Visit:    Plan of care signed (Y/N): yes  Outcome Measure: Oswestry:   Visit# / total visits:   7/10  Pain level: 0/10     Goals:     Patient goals : reduce hip and back pain  Short term goals  Time Frame for Short term goals: 5 visits  Short term goal 1: Pt will be IND with HEP in order to maximzie recovery outside of clinic MET   Long term goals  Time Frame for Long term goals : 10 visits  Long term goal 1: Pt will improve modified obers on R to parallel or below with floor to show improved IT band length  Long term goal 2: Pt will present with neutral pelvic alignment to show improved resting pelvic position   Long term goal 3: Pt will improve Oswestry to 14/50 or lower to show [address] and subjective improvement  Long term goal 4: Pt will report overall improvement of condition by 50% or more     Summary of Evaluation: Assessment: Pt is a 72-year-old male who presents to clinic with chronic LBP and R hip pain for past 10 years. Upon assessment, pt presents with mild impaired lumbar/spinal ROM, pelvic misalignment, increased tightness of hip flexors and IT band on R, impaired gait and increased LBP/ R hip pain. Pt would benefit from skilled therapy interventions to address listed impairments, progress toward goal completion and improve ADL/IADL status.  PT also warranted to reduce risk for further injury or decline. Subjective:   Placido Boles arrives to therapy stating that the back is doing great! Any changes in Ambulatory Summary Sheet? None      Objective:  COVID screening questions were asked and patient attested that there had been no contact or symptoms    Neutral pelvic alignment. Good PPT. Cues for posterior weight shift and forward lean with squats. Exercises: (No more than 4 columns)   Exercise/Equipment 7/12/21  #5 7/19/21 #6 7/22/2021 #7           WARM UP      Nu-step  S13/L5 5' no UE L5 x5' L6 5'         TABLE      PPT 2*10 8\"  X15, 5\" 15*5\"   bridge 2*15 3\" with add squeeze to reduce HS cramping X10, 3\" less knee bend to prevent cramping 10*3\"   PPT with LE kick outs (1 leg supported at all times)  2*15 ea LE   --                        STANDING      Hip flex stretch  2x30\" off edge of table w/ strap for more stretch 2*30\"    Palloff press  BlkTB 20* ea Blk TB x10 ea side BlkTB 10* ea   Trunk rotation  Blk TB x10 ea side BlkTB 10* ea   Shuttle kick back  2C x 10 1C x10 BL 1C 10* ea   Shuttle  3C BLE 2x10 3C 2*10 BLE    Lateral band walk  BTB 60' ea dir   --   STS  No UE focus on glute control with cues for reduced hip rotation x10 10*   Hip abd machine  x10 ea BLE 25# 25# 15* ea               PROPRIOCEPTION                  MODALITIES                      Other Therapeutic Activities/Education:     Home Exercise Program: Issued, practiced and pt demo ability to perform 6/25/2021      Manual Treatments:       Modalities:  none      Communication with other providers:        Assessment: Palcido Boles tolerated today's session well. He is progressing nicely towards his goals with improved ability to maintain pelvic alignment and decreased pain. He will benefit from a few more sessions of PT to solidify HEP.  End session pain: 0/10, hips are sore       Plan for Next Session:  nustep, pelvic alignment, sacral mobs, core control/ stability, IT band

## 2021-08-02 ENCOUNTER — HOSPITAL ENCOUNTER (OUTPATIENT)
Dept: PHYSICAL THERAPY | Age: 61
Discharge: HOME | End: 2021-08-02

## 2021-08-02 NOTE — FLOWSHEET NOTE
Physical Therapy  Cancellation/No-show Note  Patient Name:  Gagan Seo  :  1960   Date:  2021  Cancelled visits to date: 1  No-shows to date: 1    For today's appointment patient:  [x]  Cancelled  []  Rescheduled appointment  []  No-show     Reason given by patient:  [x]  Patient ill  []  Conflicting appointment  []  No transportation    []  Conflict with work  []  No reason given  []  Other:     Comments:      Electronically signed by:  Armando Cody PT, DPT

## 2021-08-10 ENCOUNTER — HOSPITAL ENCOUNTER (OUTPATIENT)
Dept: PHYSICAL THERAPY | Age: 61
Setting detail: THERAPIES SERIES
Discharge: HOME | End: 2021-08-10
Payer: COMMERCIAL

## 2021-08-10 PROCEDURE — 97110 THERAPEUTIC EXERCISES: CPT

## 2021-08-10 NOTE — FLOWSHEET NOTE
improve ADL/IADL status. PT also warranted to reduce risk for further injury or decline. Subjective:  Pt notes that he feels overall he is 98% better than prior to eval. He is no longer having any pain or limitations outside of clinic. He agrees with dc plans to continue with HEP outside of clinic. Oswestry: 1/50    Any changes in Ambulatory Summary Sheet? None      Objective:  COVID screening questions were asked and patient attested that there had been no contact or symptoms    -Neutral pelvic alignment   -Modified Obers: parallel with floor on R    Exercises: (No more than 4 columns)   Exercise/Equipment 7/19/21 #6 7/22/2021 #7 8/10/21 #8           WARM UP      Nu-step  L5 x5' L6 5' L6 x5'         TABLE      PPT X15, 5\" 15*5\" X15, 5\"   bridge X10, 3\" less knee bend to prevent cramping 10*3\" X15, 3\"   PPT with LE kick outs (1 leg supported at all times)   --                         STANDING      Hip flex stretch 2x30\" off edge of table w/ strap for more stretch 2*30\"  2x30\"    Palloff press  Blk TB x10 ea side BlkTB 10* ea    Trunk rotation Blk TB x10 ea side BlkTB 10* ea    Shuttle kick back  1C x10 BL 1C 10* ea    Shuttle 3C BLE 2x10 3C 2*10 BLE     Lateral band walk   --    STS No UE focus on glute control with cues for reduced hip rotation x10 10*    Hip abd machine x10 ea BLE 25# 25# 15* ea                PROPRIOCEPTION                  MODALITIES                      Other Therapeutic Activities/Education: HEP and continuation after dc for max benefits    Home Exercise Program: Issued, practiced and pt demo ability to perform 6/25/2021      Manual Treatments:       Modalities:  none      Communication with other providers:        Assessment: Pt tolerated today's treatment without any adverse reactions or complications this date. Pt has shown good improvement since therapy start with improved core stability and strength, neutral pelvic alignment, improved flexibility, and reduced pain.  Pt has met all goals at this time and is no longer having limitations. PT educated pt on continuation of HEP after discharge for max benefits and reduced risk for future decline. Pt dc this date.   End pain: 0/10      Plan for Next Session:  nustep, pelvic alignment, sacral mobs, core control/ stability, IT band rolling    Time In / Time Out:  6605 - 1429    Timed Code/Total Treatment Minutes: 27': 32' TE x2    Next Progress Note due:  10th visit    Plan of Care Interventions:  [x] Therapeutic Exercise  [x] Modalities:  [x] Therapeutic Activity     [x] Ultrasound  [x] Estim  [x] Gait Training      [] Cervical Traction [x] Lumbar Traction  [x] Neuromuscular Re-education    [x] Cold/hotpack [] Iontophoresis   [x] Instruction in HEP      [x] Vasopneumatic   [] Dry Needling    [x] Manual Therapy               [] Aquatic Therapy              Electronically signed by:  Evelyn Lott, PT, DPT  8/10/2021, 12:32 PM

## 2021-08-10 NOTE — DISCHARGE SUMMARY
Outpatient Physical Therapy           [place]           [x] Phone: [phone]   Fax: [phone]  [facility]           [] Phone: [phone]   Fax: [phone]      To: Yung Gong DO    From: Rocky Mancilla, PT, DPT     Patient: David Santos     : 1960  Diagnosis:    Chronic R-sided LBP w/o sciatica  Date: 8/10/2021  Treatment Diagnosis:   R hip pain and LBP, pelvic misalignment        []  Progress Note                [x]  Discharge Note    Evaluation Date:  21  Total Visits to date:   8 Cancels/No-shows to date:  2    Subjective:  Pt notes that he feels overall he is 98% better than prior to eval. He is no longer having any pain or limitations outside of clinic. He agrees with dc plans to continue with HEP outside of clinic. Oswestry:     Plan of Care/Treatment to date:  [x] Therapeutic Exercise    [x] Modalities:  [x] Therapeutic Activity     [] Ultrasound  [] Electrical Stimulation  [x] Gait Training      [] Cervical Traction   [] Lumbar Traction  [x] Neuromuscular Re-education  [x] Cold/hotpack [] Iontophoresis  [x] Instruction in HEP      Other:  [x] Manual Therapy       [x]  Vasopneumatic  [] Aquatic Therapy       []   Dry Needle Therapy                      Objective/Significant Findings At Last Visit/Comments:    -Neutral pelvic alignment   -Modified Obers: parallel with floor on R     Assessment:     Pt has shown good improvement since therapy start with improved core stability and strength, neutral pelvic alignment, improved flexibility, and reduced pain. Pt has met all goals at this time and is no longer having limitations. PT educated pt on continuation of HEP after discharge for max benefits and reduced risk for future decline. Pt dc this date.     Goal Status:  [x] Achieved [] Partially Achieved  [] Not Achieved     Changes to goals:    Patient goals : reduce hip and back pain  Short term goals  Time Frame for Short term goals: 5 visits  Short term goal 1: Pt will be IND with HEP in order to [facility] recovery outside of clinic ReMET 8/10  Long term goals  Time Frame for Long term goals : 10 visits  Long term goal 1: Pt will improve modified obers on R to parallel or below with floor to show improved IT band length MET 8/10  Long term goal 2: Pt will present with neutral pelvic alignment to show improved resting pelvic position MET 8/10  Long term goal 3: Pt will improve Oswestry to 14/50 or lower to show [address] Her[address] and subjective improvement MET 8/10  Long term goal 4: Pt will report overall improvement of condition by 50% or more MET 8/10       Patient Status: [] Continue per initial plan of Care     [x] Patient now discharged     [] Additional visits requested, Please re-certify for additional visits: If we are requesting more visits, we fully anticipate the patient's condition is expected to improve within the treatment timeframe we are requesting. Electronically signed by:  Bee Aguilar PT, DPT, 8/10/2021, 12:33 PM    If you have any questions or concerns, please don't hesitate to call.   Thank you for your referral.    Physician Signature:______________________ Date:______ Time: ________  By signing above, therapists plan is approved by physician

## 2021-08-25 DIAGNOSIS — M54.50 ACUTE RIGHT-SIDED LOW BACK PAIN WITHOUT SCIATICA: ICD-10-CM

## 2021-08-25 RX ORDER — TIZANIDINE 4 MG/1
4 TABLET ORAL 2 TIMES DAILY PRN
Qty: 60 TABLET | Refills: 1 | Status: SHIPPED | OUTPATIENT
Start: 2021-08-25 | End: 2021-09-22 | Stop reason: ALTCHOICE

## 2021-09-22 ENCOUNTER — OFFICE VISIT (OUTPATIENT)
Dept: INTERNAL MEDICINE CLINIC | Age: 61
End: 2021-09-22
Payer: COMMERCIAL

## 2021-09-22 VITALS
DIASTOLIC BLOOD PRESSURE: 82 MMHG | RESPIRATION RATE: 24 BRPM | OXYGEN SATURATION: 98 % | HEART RATE: 73 BPM | WEIGHT: 223 LBS | BODY MASS INDEX: 31.1 KG/M2 | SYSTOLIC BLOOD PRESSURE: 124 MMHG | TEMPERATURE: 97.9 F

## 2021-09-22 DIAGNOSIS — F33.0 MILD EPISODE OF RECURRENT MAJOR DEPRESSIVE DISORDER (HCC): ICD-10-CM

## 2021-09-22 DIAGNOSIS — F51.01 PRIMARY INSOMNIA: Primary | ICD-10-CM

## 2021-09-22 PROCEDURE — 99213 OFFICE O/P EST LOW 20 MIN: CPT | Performed by: FAMILY MEDICINE

## 2021-09-22 RX ORDER — SERTRALINE HYDROCHLORIDE 100 MG/1
100 TABLET, FILM COATED ORAL 2 TIMES DAILY
Qty: 180 TABLET | Refills: 1 | Status: SHIPPED | OUTPATIENT
Start: 2021-09-22 | End: 2021-12-22 | Stop reason: SDUPTHER

## 2021-09-22 ASSESSMENT — ENCOUNTER SYMPTOMS
NAUSEA: 0
ABDOMINAL PAIN: 0
SHORTNESS OF BREATH: 0
COUGH: 0
BACK PAIN: 0

## 2021-10-12 DIAGNOSIS — F51.01 PRIMARY INSOMNIA: Primary | ICD-10-CM

## 2021-10-12 RX ORDER — ZOLPIDEM TARTRATE 5 MG/1
5 TABLET ORAL NIGHTLY PRN
Qty: 30 TABLET | Refills: 2 | Status: SHIPPED | OUTPATIENT
Start: 2021-10-12 | End: 2021-11-11

## 2021-10-12 RX ORDER — ZOLPIDEM TARTRATE 5 MG/1
5 TABLET ORAL NIGHTLY PRN
COMMUNITY
End: 2021-10-12 | Stop reason: SDUPTHER

## 2021-10-12 NOTE — TELEPHONE ENCOUNTER
[facility] [address] [facility] Staff  Subject: Refill Request     QUESTIONS   Name of Medication? sertraline (ZOLOFT) 100 MG tablet   Patient-reported dosage and instructions? 100MG twice daily   How many days do you have left? 0   Preferred Pharmacy? [facility]/PHARMACY #[id number]   Pharmacy phone number (if available)? [phone]   ---------------------------------------------------------------------------   --------------,   Name of Medication? zolpidem (AMBIEN) 5 MG tablet   Patient-reported dosage and instructions? 5MG once at night   How many days do you have left? 2   Preferred Pharmacy? [facility]/PHARMACY #[id number]   Pharmacy phone number (if available)? [phone]   ---------------------------------------------------------------------------   --------------   CALL BACK INFO   What is the best way for the office to contact you? OK to leave message on   voicemail   Preferred Call Back Phone Number?  [phone]

## 2021-12-22 DIAGNOSIS — F33.0 MILD EPISODE OF RECURRENT MAJOR DEPRESSIVE DISORDER (HCC): ICD-10-CM

## 2021-12-22 RX ORDER — SERTRALINE HYDROCHLORIDE 100 MG/1
100 TABLET, FILM COATED ORAL 2 TIMES DAILY
Qty: 180 TABLET | Refills: 0 | Status: SHIPPED | OUTPATIENT
Start: 2021-12-22 | End: 2022-05-02

## 2022-02-08 ENCOUNTER — OFFICE VISIT (OUTPATIENT)
Dept: INTERNAL MEDICINE CLINIC | Age: 62
End: 2022-02-08
Payer: COMMERCIAL

## 2022-02-08 VITALS
BODY MASS INDEX: 31.22 KG/M2 | SYSTOLIC BLOOD PRESSURE: 116 MMHG | HEART RATE: 69 BPM | DIASTOLIC BLOOD PRESSURE: 70 MMHG | WEIGHT: 223 LBS | TEMPERATURE: 98.4 F | HEIGHT: 71 IN | OXYGEN SATURATION: 96 %

## 2022-02-08 DIAGNOSIS — Z12.5 SCREENING FOR PROSTATE CANCER: ICD-10-CM

## 2022-02-08 DIAGNOSIS — Z00.00 ANNUAL PHYSICAL EXAM: Primary | ICD-10-CM

## 2022-02-08 DIAGNOSIS — F51.01 PRIMARY INSOMNIA: ICD-10-CM

## 2022-02-08 DIAGNOSIS — E78.5 HYPERLIPIDEMIA, UNSPECIFIED HYPERLIPIDEMIA TYPE: ICD-10-CM

## 2022-02-08 DIAGNOSIS — R53.83 FATIGUE, UNSPECIFIED TYPE: ICD-10-CM

## 2022-02-08 DIAGNOSIS — F33.0 MILD EPISODE OF RECURRENT MAJOR DEPRESSIVE DISORDER (HCC): ICD-10-CM

## 2022-02-08 DIAGNOSIS — K21.9 GASTROESOPHAGEAL REFLUX DISEASE WITHOUT ESOPHAGITIS: ICD-10-CM

## 2022-02-08 PROCEDURE — 99396 PREV VISIT EST AGE 40-64: CPT | Performed by: FAMILY MEDICINE

## 2022-02-08 RX ORDER — ZOLPIDEM TARTRATE 5 MG/1
1 TABLET ORAL NIGHTLY PRN
COMMUNITY
Start: 2022-01-25 | End: 2022-02-08 | Stop reason: SDUPTHER

## 2022-02-08 RX ORDER — ZOLPIDEM TARTRATE 5 MG/1
5 TABLET ORAL NIGHTLY PRN
Qty: 30 TABLET | Refills: 2 | Status: SHIPPED | OUTPATIENT
Start: 2022-02-08 | End: 2022-05-09 | Stop reason: SDUPTHER

## 2022-02-08 ASSESSMENT — PATIENT HEALTH QUESTIONNAIRE - PHQ9
SUM OF ALL RESPONSES TO PHQ9 QUESTIONS 1 & 2: 0
10. IF YOU CHECKED OFF ANY PROBLEMS, HOW DIFFICULT HAVE THESE PROBLEMS MADE IT FOR YOU TO DO YOUR WORK, TAKE CARE OF THINGS AT HOME, OR GET ALONG WITH OTHER PEOPLE: 0
2. FEELING DOWN, DEPRESSED OR HOPELESS: 0
SUM OF ALL RESPONSES TO PHQ QUESTIONS 1-9: 2
8. MOVING OR SPEAKING SO SLOWLY THAT OTHER PEOPLE COULD HAVE NOTICED. OR THE OPPOSITE, BEING SO FIGETY OR RESTLESS THAT YOU HAVE BEEN MOVING AROUND A LOT MORE THAN USUAL: 0
6. FEELING BAD ABOUT YOURSELF - OR THAT YOU ARE A FAILURE OR HAVE LET YOURSELF OR YOUR FAMILY DOWN: 0
7. TROUBLE CONCENTRATING ON THINGS, SUCH AS READING THE NEWSPAPER OR WATCHING TELEVISION: 0
4. FEELING TIRED OR HAVING LITTLE ENERGY: 1
5. POOR APPETITE OR OVEREATING: 1
SUM OF ALL RESPONSES TO PHQ QUESTIONS 1-9: 2
3. TROUBLE FALLING OR STAYING ASLEEP: 0
SUM OF ALL RESPONSES TO PHQ QUESTIONS 1-9: 2
SUM OF ALL RESPONSES TO PHQ QUESTIONS 1-9: 2
9. THOUGHTS THAT YOU WOULD BE BETTER OFF DEAD, OR OF HURTING YOURSELF: 0
1. LITTLE INTEREST OR PLEASURE IN DOING THINGS: 0

## 2022-02-08 ASSESSMENT — ENCOUNTER SYMPTOMS
EYES NEGATIVE: 1
SHORTNESS OF BREATH: 0
BACK PAIN: 0
ABDOMINAL PAIN: 0
BLOOD IN STOOL: 0
NAUSEA: 0
CONSTIPATION: 0
COUGH: 0
DIARRHEA: 0

## 2022-02-08 NOTE — PROGRESS NOTES
Well Adult Note  Name: Kendy Aguilar Date: 2022   MRN: [id number] Sex: Male   Age: 62 y.o. Ethnicity: Non- / Non    : 1960 Race: White (non-)      Dearl Opitz is here for well adult exam.  History:  Patient Active Problem List    Diagnosis Date Noted    Gastroesophageal reflux disease without esophagitis 2022    Glaucoma     Mild episode of recurrent major depressive disorder ([facility] [address]     Hyperlipidemia 2020    Herpes labialis     Primary insomnia 2020     Insomnia- stable on Ambien prn  Depression- stable on Zoloft  HLD- Pt trying to manage with his diet  GERD- He does not use medications for the symptoms. Review of Systems   Constitutional: Positive for fatigue. Negative for chills, diaphoresis and fever. HENT: Negative. Eyes: Negative. Respiratory: Negative for cough and shortness of breath. Cardiovascular: Negative for chest pain and palpitations. Gastrointestinal: Negative for abdominal pain, blood in stool, constipation, diarrhea and nausea. GERD   Genitourinary: Negative for difficulty urinating and dysuria. Musculoskeletal: Negative for back pain. Neurological: Negative for dizziness, light-headedness and headaches. Psychiatric/Behavioral: Negative for dysphoric mood. Allergies   Allergen Reactions    Demerol     Simvastatin      Cramps         Prior to Visit Medications    Medication Sig Taking? Authorizing Provider   VITAMIN D PO Take by mouth 5000IU Yes Historical Provider, MD   zolpidem (AMBIEN) 5 MG tablet Take 1 tablet by mouth nightly as needed for Sleep for up to 30 days.  Yes Jane April, DO   sertraline (ZOLOFT) 100 MG tablet Take 1 tablet by mouth 2 times daily Yes Jane Burrows, DO   acetaminophen (TYLENOL) 325 MG tablet Take 650 mg by mouth every 6 hours as needed for Pain Yes Historical Provider, MD   ibuprofen (ADVIL;MOTRIN) 200 MG tablet Take 200 mg by mouth every 6 hours as needed for Pain Yes Historical Provider, MD   valACYclovir (VALTREX) 1 g tablet Take 1 tablet by mouth daily Yes Ashu Hanna,          Past Medical History:   Diagnosis Date    Depression     Exposure to viral disease     GERD with esophagitis     Glaucoma     Herpes labialis     Hyperlipidemia     Insomnia     Mild episode of recurrent major depressive disorder (HCC)     Osteoarthritis     Right sacral radiculopathy     Tarsal tunnel syndrome        Past Surgical History:   Procedure Laterality Date    COLONOSCOPY  04/09/2018    colon polyps, divertcula, Hem- Dr Kaila Анна         History reviewed. No pertinent family history. Social History     Tobacco Use    Smoking status: Never Smoker    Smokeless tobacco: Never Used   Substance Use Topics    Alcohol use: Never    Drug use: Yes     Types: Marijuana (Weed)       Objective   /70 (Site: Right Upper Arm, Position: Sitting, Cuff Size: Medium Adult)   Pulse 69   Temp 98.4 °F (36.9 °C)   Ht 5' 11\" (1.803 m)   Wt 223 lb (101.2 kg)   SpO2 96%   BMI 31.10 kg/m²   Wt Readings from Last 3 Encounters:   02/08/22 223 lb (101.2 kg)   09/22/21 223 lb (101.2 kg)   06/10/21 218 lb 12.8 oz (99.2 kg)     There were no vitals filed for this visit. Physical Exam  Vitals reviewed. Constitutional:       General: He is not in acute distress. HENT:      Right Ear: Tympanic membrane normal.      Left Ear: Tympanic membrane normal.   Eyes:      Extraocular Movements: Extraocular movements intact. Conjunctiva/sclera: Conjunctivae normal.      Pupils: Pupils are equal, round, and reactive to light. Cardiovascular:      Rate and Rhythm: Normal rate and regular rhythm. Pulmonary:      Effort: Pulmonary effort is normal. No respiratory distress. Breath sounds: Normal breath sounds. Abdominal:      General: Bowel sounds are normal.      Palpations: Abdomen is soft. Tenderness: There is no abdominal tenderness.    Musculoskeletal: Cervical back: Neck supple. Right lower leg: No edema. Left lower leg: No edema. Skin:     Findings: No rash. Neurological:      General: No focal deficit present. Mental Status: He is alert and oriented to person, place, and time. Psychiatric:         Mood and Affect: Mood normal.           Assessment   Plan   1. Annual physical exam  -     Psa screening; Future  -     CBC Auto Differential; Future  -     Comprehensive Metabolic Panel; Future  -     Lipid Panel; Future  2. Primary insomnia  -     zolpidem (AMBIEN) 5 MG tablet; Take 1 tablet by mouth nightly as needed for Sleep for up to 30 days. , Disp-30 tablet, R-2Normal  Oarrs reviewed  3. Mild episode of recurrent major depressive disorder ([facility] [address]  Continue Zoloft  4. Hyperlipidemia, unspecified hyperlipidemia type  -     TSH WITH REFLEX TO FT4; Future  5. Fatigue, unspecified type  -     CBC Auto Differential; Future  -     Comprehensive Metabolic Panel; Future  -     VITAMIN D 25 HYDROXY; Future  -     VITAMIN B12 & FOLATE; Future  -     TSH WITH REFLEX TO FT4; Future  6. Gastroesophageal reflux disease without esophagitis  7. Screening for prostate cancer  -     Psa screening;  Future         Personalized Preventive Plan   Current Health Maintenance Status  Immunization History   Administered Date(s) Administered    Influenza, Quadv, IM, PF (6 mo and older Fluzone, Flulaval, Fluarix, and 3 yrs and older Afluria) 12/07/2020        Health Maintenance   Topic Date Due    Depression Monitoring  Never done    DTaP/Tdap/Td vaccine (1 - Tdap) Never done    Shingles Vaccine (1 of 2) Never done    COVID-19 Vaccine (1) 06/10/2022 (Originally 3/26/1965)    Flu vaccine (1) 02/08/2023 (Originally 9/1/2021)    Colon cancer screen colonoscopy  04/09/2023    Lipid screen  08/19/2025    Hepatitis C screen  Completed    Hepatitis A vaccine  Aged Out    Hepatitis B vaccine  Aged Out    Hib vaccine  Aged Out    Meningococcal (ACWY) vaccine Aged Out    Pneumococcal 0-64 years Vaccine  Aged Out    HIV screen  Discontinued     Recommendations for Preventive Services Due: see orders and patient instructions/AVS.    Persist RTO or call    Return in about 3 months (around 5/8/2022) for Depression, insomnia.

## 2022-04-30 DIAGNOSIS — F33.0 MILD EPISODE OF RECURRENT MAJOR DEPRESSIVE DISORDER (HCC): ICD-10-CM

## 2022-05-02 RX ORDER — SERTRALINE HYDROCHLORIDE 100 MG/1
TABLET, FILM COATED ORAL
Qty: 180 TABLET | Refills: 0 | Status: SHIPPED | OUTPATIENT
Start: 2022-05-02 | End: 2022-05-09 | Stop reason: SDUPTHER

## 2022-05-07 LAB
ALBUMIN SERPL-MCNC: 4.3 G/DL
ALBUMIN SERPL-MCNC: 4.3 G/DL
ALP BLD-CCNC: 65 U/L
ALP BLD-CCNC: 65 U/L
ALT SERPL-CCNC: 13 U/L
ALT SERPL-CCNC: 13 U/L
ANION GAP SERPL CALCULATED.3IONS-SCNC: 2 MMOL/L
ANION GAP SERPL CALCULATED.3IONS-SCNC: NORMAL MMOL/L
AST SERPL-CCNC: 14 U/L
AST SERPL-CCNC: 14 U/L
BASOPHILS ABSOLUTE: 0.1 /ΜL
BASOPHILS RELATIVE PERCENT: 0.5 %
BILIRUB SERPL-MCNC: 0.3 MG/DL (ref 0.1–1.4)
BILIRUB SERPL-MCNC: 0.3 MG/DL (ref 0.1–1.4)
BUN BLDV-MCNC: 15 MG/DL
BUN BLDV-MCNC: 17 MG/DL
CALCIUM SERPL-MCNC: 9.3 MG/DL
CALCIUM SERPL-MCNC: 9.3 MG/DL
CHLORIDE BLD-SCNC: 105 MMOL/L
CHLORIDE BLD-SCNC: 105 MMOL/L
CHOLESTEROL, TOTAL: 244 MG/DL
CHOLESTEROL/HDL RATIO: ABNORMAL
CO2: 22 MMOL/L
CO2: 22 MMOL/L
CREAT SERPL-MCNC: 1.1 MG/DL
CREAT SERPL-MCNC: 1.1 MG/DL
EOSINOPHILS ABSOLUTE: 0.2 /ΜL
EOSINOPHILS RELATIVE PERCENT: 2.3 %
GFR CALCULATED: 76
GFR CALCULATED: NORMAL
GLUCOSE BLD-MCNC: 108 MG/DL
GLUCOSE BLD-MCNC: 108 MG/DL
HCT VFR BLD CALC: 40.2 % (ref 41–53)
HDLC SERPL-MCNC: 25 MG/DL (ref 35–70)
HEMOGLOBIN: 14.3 G/DL (ref 13.5–17.5)
LDL CHOLESTEROL CALCULATED: 163 MG/DL (ref 0–160)
LYMPHOCYTES ABSOLUTE: 2.9 /ΜL
LYMPHOCYTES RELATIVE PERCENT: 27.9 %
MCH RBC QN AUTO: 31 PG
MCHC RBC AUTO-ENTMCNC: 35.6 G/DL
MCV RBC AUTO: 87 FL
MONOCYTES ABSOLUTE: 0.6 /ΜL
MONOCYTES RELATIVE PERCENT: 6.1 %
NEUTROPHILS ABSOLUTE: 6.4 /ΜL
NEUTROPHILS RELATIVE PERCENT: 62.8 %
NONHDLC SERPL-MCNC: ABNORMAL MG/DL
PDW BLD-RTO: 12.7 %
PLATELET # BLD: 216 K/ΜL
PMV BLD AUTO: 11.8 FL
POTASSIUM SERPL-SCNC: 4.1 MMOL/L
POTASSIUM SERPL-SCNC: 4.1 MMOL/L
PROSTATE SPECIFIC ANTIGEN FREE: NORMAL
PROSTATE SPECIFIC ANTIGEN PERCENT FREE: NORMAL
PSA-PROSTATE SPECIFIC AG: 0.55
RBC # BLD: 4.62 10^6/ΜL
SODIUM BLD-SCNC: 141 MMOL/L
SODIUM BLD-SCNC: 141 MMOL/L
TOTAL PROTEIN: 6.5
TOTAL PROTEIN: 6.5
TRIGL SERPL-MCNC: 279 MG/DL
TSH SERPL DL<=0.05 MIU/L-ACNC: 3.4 UIU/ML
VITAMIN B-12: 487
VITAMIN D 25-HYDROXY: 36
VITAMIN D2, 25 HYDROXY: NORMAL
VITAMIN D3,25 HYDROXY: NORMAL
VLDLC SERPL CALC-MCNC: 56 MG/DL
WBC # BLD: 10.2 10^3/ML

## 2022-05-09 ENCOUNTER — OFFICE VISIT (OUTPATIENT)
Dept: INTERNAL MEDICINE CLINIC | Age: 62
End: 2022-05-09
Payer: COMMERCIAL

## 2022-05-09 VITALS
SYSTOLIC BLOOD PRESSURE: 112 MMHG | WEIGHT: 220 LBS | HEART RATE: 64 BPM | OXYGEN SATURATION: 98 % | BODY MASS INDEX: 30.8 KG/M2 | HEIGHT: 71 IN | DIASTOLIC BLOOD PRESSURE: 60 MMHG

## 2022-05-09 DIAGNOSIS — M79.672 CHRONIC FOOT PAIN, LEFT: ICD-10-CM

## 2022-05-09 DIAGNOSIS — G89.29 CHRONIC FOOT PAIN, LEFT: ICD-10-CM

## 2022-05-09 DIAGNOSIS — R73.01 IFG (IMPAIRED FASTING GLUCOSE): ICD-10-CM

## 2022-05-09 DIAGNOSIS — F51.01 PRIMARY INSOMNIA: Primary | ICD-10-CM

## 2022-05-09 DIAGNOSIS — M51.36 DDD (DEGENERATIVE DISC DISEASE), LUMBAR: ICD-10-CM

## 2022-05-09 DIAGNOSIS — E78.5 HYPERLIPIDEMIA, UNSPECIFIED HYPERLIPIDEMIA TYPE: ICD-10-CM

## 2022-05-09 DIAGNOSIS — F33.0 MILD EPISODE OF RECURRENT MAJOR DEPRESSIVE DISORDER (HCC): ICD-10-CM

## 2022-05-09 PROCEDURE — 99214 OFFICE O/P EST MOD 30 MIN: CPT | Performed by: FAMILY MEDICINE

## 2022-05-09 RX ORDER — ROSUVASTATIN CALCIUM 5 MG/1
5 TABLET, COATED ORAL DAILY
Qty: 30 TABLET | Refills: 3 | Status: SHIPPED | OUTPATIENT
Start: 2022-05-09 | End: 2022-06-06

## 2022-05-09 RX ORDER — SERTRALINE HYDROCHLORIDE 100 MG/1
TABLET, FILM COATED ORAL
Qty: 180 TABLET | Refills: 0 | Status: SHIPPED | OUTPATIENT
Start: 2022-05-09 | End: 2022-10-24

## 2022-05-09 RX ORDER — ZOLPIDEM TARTRATE 5 MG/1
5 TABLET ORAL NIGHTLY PRN
Qty: 30 TABLET | Refills: 2 | Status: SHIPPED | OUTPATIENT
Start: 2022-05-09 | End: 2022-06-08

## 2022-05-09 ASSESSMENT — ENCOUNTER SYMPTOMS
NAUSEA: 0
BACK PAIN: 1
COUGH: 0
ABDOMINAL PAIN: 0
SHORTNESS OF BREATH: 0

## 2022-05-09 NOTE — PROGRESS NOTES
Stu Jones (:  1960) is a 65 y.o. male,Established patient, here for evaluation of the following chief complaint(s):  Depression and Hyperlipidemia         ASSESSMENT/PLAN:  1. Primary insomnia, chronic  -     zolpidem (AMBIEN) 5 MG tablet; Take 1 tablet by mouth nightly as needed for Sleep for up to 30 days. , Disp-30 tablet, R-2Normal  Oarrs checked  2. Hyperlipidemia, unspecified hyperlipidemia type, chronic  -Start Crestor 5 mg  -     rosuvastatin (CRESTOR) 5 MG tablet; Take 1 tablet by mouth daily, Disp-30 tablet, R-3Normal  -     Lipid Panel; Future  ADR's explained   The patient is asked to make an attempt to improve diet and exercise patterns to aid in medical management of this problem. 3. Mild episode of recurrent major depressive disorder (HCC), chronic  -     sertraline (ZOLOFT) 100 MG tablet; TAKE 1 TABLET BY MOUTH TWICE A DAY, Disp-180 tablet, R-0Normal  4. IFG (impaired fasting glucose)  -     Hemoglobin A1C; Future  -     Hepatic Function Panel; Future  5. DDD (degenerative disc disease), lumbar  6. Chronic foot pain, left  Pt declines Xray of foot    [facility] labs reviewed: Lipids, CBC, CMP, TSH, Vit D, B12, PSA  Persist RTO or call  Return in about 3 months (around 2022) for Insomnia, Depression.          Subjective   SUBJECTIVE/OBJECTIVE:  HPI: This  66 yo  M here for the following:  Patient Active Problem List    Diagnosis Date Noted    IFG (impaired fasting glucose) 2022    Gastroesophageal reflux disease without esophagitis 2022    Glaucoma     Mild episode of recurrent major depressive disorder ([facility] [address]     Hyperlipidemia 2020    Herpes labialis     Primary insomnia 2020     Depression- stable on Zoloft  Insomnia- On Ambien w/o SE  HLD- , , HDL 25,-pt trying to manage with diet, but willing to try a Statin again  IFG  Elevated CV risk 14.15 %  Chronic L foot pain- No known  injury   DDD- lumbar    Review of Systems   Constitutional: Negative for diaphoresis and fever. Respiratory: Negative for cough and shortness of breath. Cardiovascular: Negative for chest pain and palpitations. Gastrointestinal: Negative for abdominal pain and nausea. Genitourinary: Negative for difficulty urinating. Musculoskeletal: Positive for arthralgias (sholulders) and back pain. Neurological: Negative for dizziness and headaches. Psychiatric/Behavioral: Negative for dysphoric mood. Allergies   Allergen Reactions    Demerol     Simvastatin      Cramps     Current Outpatient Medications   Medication Sig Dispense Refill    zolpidem (AMBIEN) 5 MG tablet Take 1 tablet by mouth nightly as needed for Sleep for up to 30 days. 30 tablet 2    sertraline (ZOLOFT) 100 MG tablet TAKE 1 TABLET BY MOUTH TWICE A  tablet 0    rosuvastatin (CRESTOR) 5 MG tablet Take 1 tablet by mouth daily 30 tablet 3    VITAMIN D PO Take by mouth 5000IU      acetaminophen (TYLENOL) 325 MG tablet Take 650 mg by mouth every 6 hours as needed for Pain      ibuprofen (ADVIL;MOTRIN) 200 MG tablet Take 200 mg by mouth every 6 hours as needed for Pain      valACYclovir (VALTREX) 1 g tablet Take 1 tablet by mouth daily 30 tablet 3     No current facility-administered medications for this visit. Vitals:    05/09/22 1435   BP: 112/60   Site: Right Upper Arm   Position: Sitting   Cuff Size: Medium Adult   Pulse: 64   SpO2: 98%   Weight: 220 lb (99.8 kg)   Height: 5' 11\" (1.803 m)     Objective   Physical Exam  Vitals reviewed. Constitutional:       General: He is not in acute distress. Eyes:      Extraocular Movements: Extraocular movements intact. Conjunctiva/sclera: Conjunctivae normal.   Cardiovascular:      Rate and Rhythm: Normal rate and regular rhythm. Pulmonary:      Effort: Pulmonary effort is normal. No respiratory distress. Breath sounds: Normal breath sounds. Abdominal:      Palpations: Abdomen is soft. Tenderness:  There is no abdominal tenderness. Musculoskeletal:      Cervical back: Neck supple. Right lower leg: No edema. Left lower leg: No edema. Neurological:      Mental Status: He is alert and oriented to person, place, and time. Psychiatric:         Mood and Affect: Mood normal.                An electronic signature was used to authenticate this note.     --Natanael Cam DO

## 2022-06-03 DIAGNOSIS — R53.83 FATIGUE, UNSPECIFIED TYPE: ICD-10-CM

## 2022-06-03 DIAGNOSIS — Z00.00 ANNUAL PHYSICAL EXAM: ICD-10-CM

## 2022-06-05 DIAGNOSIS — E78.5 HYPERLIPIDEMIA, UNSPECIFIED HYPERLIPIDEMIA TYPE: ICD-10-CM

## 2022-06-06 RX ORDER — ROSUVASTATIN CALCIUM 5 MG/1
TABLET, COATED ORAL
Qty: 30 TABLET | Refills: 3 | Status: SHIPPED | OUTPATIENT
Start: 2022-06-06 | End: 2022-07-18

## 2022-06-22 ENCOUNTER — TELEPHONE (OUTPATIENT)
Dept: INTERNAL MEDICINE CLINIC | Age: 62
End: 2022-06-22

## 2022-06-22 DIAGNOSIS — M25.512 BILATERAL SHOULDER PAIN, UNSPECIFIED CHRONICITY: Primary | ICD-10-CM

## 2022-06-22 DIAGNOSIS — M25.511 BILATERAL SHOULDER PAIN, UNSPECIFIED CHRONICITY: Primary | ICD-10-CM

## 2022-06-22 NOTE — TELEPHONE ENCOUNTER
Patient called because both of  his shoulder still is bothering him from the last appt he had with  he is also having trouble sleeping. An MRI was discussed at his last appt. And would like an MRI ordered to see whats going on.

## 2022-06-23 NOTE — TELEPHONE ENCOUNTER
Spoke to patient. Hx of chronic shoulder  Pain worse after lifting in both shoulders. Referral ordered and patient has number to call .

## 2022-06-28 ENCOUNTER — OFFICE VISIT (OUTPATIENT)
Dept: ORTHOPEDIC SURGERY | Age: 62
End: 2022-06-28
Payer: COMMERCIAL

## 2022-06-28 VITALS
WEIGHT: 215 LBS | HEART RATE: 63 BPM | RESPIRATION RATE: 17 BRPM | SYSTOLIC BLOOD PRESSURE: 120 MMHG | BODY MASS INDEX: 30.1 KG/M2 | OXYGEN SATURATION: 97 % | HEIGHT: 71 IN | DIASTOLIC BLOOD PRESSURE: 78 MMHG

## 2022-06-28 DIAGNOSIS — M19.012 LOCALIZED OSTEOARTHRITIS OF SHOULDER REGIONS, BILATERAL: Primary | ICD-10-CM

## 2022-06-28 DIAGNOSIS — M19.011 LOCALIZED OSTEOARTHRITIS OF SHOULDER REGIONS, BILATERAL: Primary | ICD-10-CM

## 2022-06-28 PROCEDURE — 99203 OFFICE O/P NEW LOW 30 MIN: CPT | Performed by: STUDENT IN AN ORGANIZED HEALTH CARE EDUCATION/TRAINING PROGRAM

## 2022-06-28 RX ORDER — METHYLPREDNISOLONE 4 MG/1
TABLET ORAL
Qty: 1 KIT | Refills: 0 | Status: SHIPPED | OUTPATIENT
Start: 2022-06-28 | End: 2022-07-04

## 2022-06-28 RX ORDER — ASCORBIC ACID 500 MG
1000 TABLET ORAL DAILY
COMMUNITY

## 2022-06-28 NOTE — PROGRESS NOTES
Patient is a 65y.o. year old male. Patient is in the office today with bilateral shoulder pain. Pain scale  2/10 currently in his right shoulder He reports a hx of surgery for torn labrum on his right shoulder in 2003 done by Dr. Carlyn Tirado. He states that his shoulder grinds and he has pain with reaching behind his back or overhead. He reports 6/10 pain at the worst. He rates 1/10 left shoulder pain. He states that he has to modify his ADLs to avoid pain. X-rays were taken today.    Occupation: retired  Dominant Hand: right

## 2022-06-28 NOTE — PROGRESS NOTES
6/28/2022   Chief Complaint   Patient presents with    Shoulder Pain     bilateral        History of Present Illness:                             Camacho Perez is a 65 y.o. male right handed patient referred by PCP for evaluation and treatment bilateral shoulder pain. This is evaluated as a personal injury. Patient states he does have a history of right shoulder pain status post torn labrum that required surgery in 2003 by an outside physician. He states that he did have an additional injury shortly after and return to the labrum but did not seek any further treatment. He states he sat mild bilateral shoulder pain since then but states that approximately 1 month ago he awoke and had significant bilateral shoulder pain. He states that over the last week his shoulder pain has significantly improved bilaterally. He has had no recent advanced imaging or other treatments thus far. This is his first time seeing me. Patient denies any recent injury including around the time of the increased shoulder pain. The pain occurs every day and when active. Location of pain is glenohumeral joint bilaterally as well as AC joint on palpation and biceps groove on palpation on right. Symptoms are aggravated by ADL's, repetitive use, work at or above shoulder height, difficulty sleeping on affected side. Symptoms are diminished by rest, avoiding the painful activities. Limited activities include: lifting, repetitive use, reaching behind body. No stiffness is reported. Related history: negative for prior additional surgery outside of labral repair on right shoulder in 2003, trauma, arthritis or disorders. Is affecting ADLs. Pain is 5/10 at it's worst.  Pain is currently 2/10    Outside reports reviewed: none. Patient's medications, allergies, past medical, surgical, social and family histories were reviewed and updated as appropriate.      Patient has had no recent treatment including any recent physical therapy or injections. Medical History  Patient's medications, allergies, past medical, surgical, social and family histories were reviewed and updated as appropriate. Past Medical History:   Diagnosis Date    Depression     Exposure to viral disease     GERD with esophagitis     Glaucoma     Herpes labialis     Hyperlipidemia     Insomnia     Mild episode of recurrent major depressive disorder (HCC)     Osteoarthritis     Right sacral radiculopathy     Tarsal tunnel syndrome      Past Surgical History:   Procedure Laterality Date    COLONOSCOPY  04/09/2018    colon polyps, divertcula, Hem- Dr Linda Lenz       No family history on file. Social History     Socioeconomic History    Marital status:      Spouse name: Not on file    Number of children: 3    Years of education: Not on file    Highest education level: Not on file   Occupational History    Not on file   Tobacco Use    Smoking status: Never Smoker    Smokeless tobacco: Never Used   Substance and Sexual Activity    Alcohol use: Never    Drug use: Yes     Types: Marijuana Bernie Beat)    Sexual activity: Not on file   Other Topics Concern    Not on file   Social History Narrative    Not on file     Social Determinants of Health     Financial Resource Strain:     Difficulty of Paying Living Expenses: Not on file   Food Insecurity:     Worried About Running Out of Food in the Last Year: Not on file    Hannah of Food in the Last Year: Not on file   Transportation Needs:     Lack of Transportation (Medical): Not on file    Lack of Transportation (Non-Medical):  Not on file   Physical Activity:     Days of Exercise per Week: Not on file    Minutes of Exercise per Session: Not on file   Stress:     Feeling of Stress : Not on file   Social Connections:     Frequency of Communication with Friends and Family: Not on file    Frequency of Social Gatherings with Friends and Family: Not on file    Attends Druze Services: Not on file    Active Member of Clubs or Organizations: Not on file    Attends Club or Organization Meetings: Not on file    Marital Status: Not on file   Intimate Partner Violence:     Fear of Current or Ex-Partner: Not on file    Emotionally Abused: Not on file    Physically Abused: Not on file    Sexually Abused: Not on file   Housing Stability:     Unable to Pay for Housing in the Last Year: Not on file    Number of [place] in the Last Year: Not on file    Unstable Housing in the Last Year: Not on file     Current Outpatient Medications   Medication Sig Dispense Refill    vitamin C (ASCORBIC ACID) 500 MG tablet Take 1,000 mg by mouth daily      rosuvastatin (CRESTOR) 5 MG tablet TAKE 1 TABLET BY MOUTH EVERY DAY 30 tablet 3    sertraline (ZOLOFT) 100 MG tablet TAKE 1 TABLET BY MOUTH TWICE A  tablet 0    VITAMIN D PO Take by mouth 5000IU      acetaminophen (TYLENOL) 325 MG tablet Take 650 mg by mouth every 6 hours as needed for Pain      ibuprofen (ADVIL;MOTRIN) 200 MG tablet Take 200 mg by mouth every 6 hours as needed for Pain      valACYclovir (VALTREX) 1 g tablet Take 1 tablet by mouth daily 30 tablet 3     No current facility-administered medications for this visit. Allergies   Allergen Reactions    Demerol     Simvastatin      Cramps         Review of Systems   Constitutional: Positive for activity change. Negative for fatigue and unexpected weight change. HENT: Negative for hearing loss, sneezing and sore throat. Respiratory: Negative for cough, shortness of breath and wheezing. Cardiovascular: Negative for chest pain and leg swelling. Gastrointestinal: Negative for diarrhea, nausea and vomiting. Musculoskeletal: Positive for arthralgias and myalgias. Negative for back pain, gait problem, joint swelling, neck pain and neck stiffness. Skin: Negative for color change, pallor, rash and wound.    Neurological: Negative for speech difficulty, weakness and numbness. Psychiatric/Behavioral: Negative for behavioral problems and confusion. The patient is not hyperactive. Examination:  General Exam:  Vitals: /78   Pulse 63   Resp 17   Ht 5' 11\" (1.803 m)   Wt 215 lb (97.5 kg)   SpO2 97%   BMI 29.99 kg/m²    Physical Exam  Constitutional:       General: He is not in acute distress. Appearance: Normal appearance. He is normal weight. He is not ill-appearing. HENT:      Head: Normocephalic and atraumatic. Nose: Nose normal.   Eyes:      General:         Right eye: No discharge. Left eye: No discharge. Extraocular Movements: Extraocular movements intact. Cardiovascular:      Pulses: Normal pulses. Pulmonary:      Effort: Pulmonary effort is normal.      Breath sounds: Normal breath sounds. Musculoskeletal:         General: Tenderness present. No swelling, deformity or signs of injury. Right shoulder: Tenderness, bony tenderness and crepitus present. No swelling, deformity, effusion or laceration. Normal range of motion. Normal strength. Normal pulse. Left shoulder: Normal.      Right upper arm: Normal.      Left upper arm: Normal.      Right elbow: Normal.      Left elbow: Normal.      Right forearm: Normal.      Left forearm: Normal.      Right wrist: Normal.      Left wrist: Normal.      Cervical back: Normal and normal range of motion. No rigidity or tenderness. Skin:     General: Skin is warm and dry. Capillary Refill: Capillary refill takes less than 2 seconds. Neurological:      General: No focal deficit present. Mental Status: He is alert and oriented to person, place, and time.         BILATERAL SHOULDER / UPPER EXTREMITY EXAM      OBSERVATION:      Swelling: none   Deformity: none    Discoloration: none   Scapular winging: none    Scars: none    Atrophy: none      TENDERNESS / CREPITUS (T/C):      Clavicle -/-    SUPRAspinatus  -/-     AC Jt. + on right only/-  INFRAspinatus -/-     SC Jt. -/-    Deltoid -/-     G. Tuberosity -/-   LH BICEP groove + on right only/-    Acromion: -/-    Midline Neck -/-     Scapular Spine -/-   Trapezium -/-    Inf Border Scapula -/-   GH jt. line - post -/-     Scapulothoracic -/-   GH jt. line - ant +/-       ROM: (* = with pain)  Left shoulder   Right shoulder     AROM (PROM)  AROM (PROM)    FE   170° (175°)    170° (175°)      ER 0°   60° (65°)   60°  (65°)    ER 90° ABD  90°  (90°)   90°  (90°)    IR 90° ABD  40  (40°)    40 (40°) *     IR (spine) T10    T12*      STRENGTH: (* = with pain) Left shoulder   Right shoulder    SCAPTION   5/5    5/5     IR    5/5    5/5    ER    5/5    5/5    Deltoid   5/5    5/5    Biceps   5/5    5/5    Triceps   5/5    5/5          SIGNS:  RUE     Jobes/Empty Can: Neg    NEER: neg    BEAVERS: neg     ORUDDYS: neg      SPEEDS: neg   Yergason's: neg    DROP ARM: neg  BELLY PRESS: neg    LIFT-OFF: neg      X-Body ADD: Positive on right only      Crank: neg   Bear Hug: neg    Biceps stretch test: Positive on right only      EXTREMITY NEURO-VASCULAR EXAM:     Sensation grossly intact to light touch all dermatomal regions. DTR 2+ Biceps, Triceps, BR and Negative Kobes sign    Grossly intact motor function at Elbow, Wrist and Hand    Distal pulses radial and ulnar 2+, brisk cap refill, symmetric. NECK:  Painless FROM and spinous processes non-tender. Negative Spurlings sign. Diagnostic testing:  X-ray images were reviewed by myself and discussed with the patient:  3 views of the right shoulder in a skeletally mature patient shows no acute osseous abnormalities. Patient has moderate AC joint osteoarthritis and minimal glenohumeral osteoarthritic changes. Appropriate acromiohumeral distance. No sign of any cystic changes at the greater tuberosity, humeral head, or glenoid. No sign of soft tissue avulsion type injury. Alignment is appropriate throughout. Good bone quality seen. No soft tissue calcification seen. Type II acromion. 4 views of left shoulder evaluated by my partner, Layo Kraft, demonstrates:  X-ray 4 views Left Shoulder:       X-rays show moderate degenerative changes at the acromioclavicular joint    with joint space narrowing and small osteophyte formation and spurring.     No degenerative changes at the glenohumeral joint.  Type II acromion.     Normal bone density.  Normal alignment.  No abnormal calcifications or    soft tissue swelling.       Impression: Acromioclavicular joint degenerative changes with type II    acromion         Office Procedures:  No orders of the defined types were placed in this encounter. Assessment and Plan    A: Bilateral shoulder pain, right shoulder biceps tendinitis, AC joint osteoarthritis, glenohumeral osteoarthritis    P:   I had a thorough conversation with the patient regarding his bilateral shoulder pain. I explained that the right shoulder seemingly is worse on examination but his issues are related to more of the osteoarthritic changes at the [facility] [place] joint as well as inflammation at the biceps. He does have pain at the glenohumeral joint but he has minimal osteoarthritic changes. I explained the different types and locations of injections I could perform to help with the shoulder pain but I also discussed trying a Medrol Dosepak which is more of a broad way of treating his pain with steroid and he would like to pursue this as he does not like injections. Patient was given a prescription for Medrol Dosepak as well as home exercise program to be used for both shoulders. He will continue to ice and over-the-counter anti-inflammatories for pain control. He will follow-up in 3 months for reevaluation. We can discuss specific cortisone ejections at that time if a specific area of either shoulder presents as most problematic.   We can also discuss MRI at that time if warranted but I explained him that at this time I am not concerned for any type of rotator cuff pathology as he is full and symmetric range of motion and strength at this time and no pain referred to his rotator cuff insertion. Patient can remain weightbearing and range of motion as tolerated. All questions were answered and patient voices understanding.       Electronically signed by Elly Vasquez DO on 6/28/2022 at 3:31 PM

## 2022-06-28 NOTE — PATIENT INSTRUCTIONS
Medication prescribed today. Home exercises  Follow up in 3 months  Patient Education        Shoulder Arthritis: Exercises  Introduction  Here are some examples of exercises for you to try. The exercises may be suggested for a condition or for rehabilitation. Start each exercise slowly. Ease off the exercises if you start to have pain. You will be told when to start these exercises and which ones will work bestfor you. How to do the exercises  Shoulder flexion (lying down)    To make a wand for this exercise, use a piece of PVC pipe or a broom handlewith the broom removed. Make the wand about a foot wider than your shoulders. 1. Lie on your back, holding a wand with both hands. Your palms should face down as you hold the wand. 2. Keeping your elbows straight, slowly raise your arms over your head. Raise them until you feel a stretch in your shoulders, upper back, and chest.  3. Hold for 15 to 30 seconds. 4. Repeat 2 to 4 times. Shoulder rotation (lying down)    To make a wand for this exercise, use a piece of PVC pipe or a broom handlewith the broom removed. Make the wand about a foot wider than your shoulders. 1. Lie on your back. Hold a wand with both hands with your elbows bent and palms up. 2. Keep your elbows close to your body, and move the wand across your body toward the sore arm. 3. Hold for 8 to 12 seconds. 4. Repeat 2 to 4 times. Shoulder internal rotation with towel    1. Hold a towel above and behind your head with the arm that is not sore. 2. With your sore arm, reach behind your back and grasp the towel. 3. With the arm above your head, pull the towel upward. Do this until you feel a stretch on the front and outside of your sore shoulder. 4. Hold 15 to 30 seconds. 5. Repeat 2 to 4 times. Shoulder blade squeeze    1. Stand with your arms at your sides, and squeeze your shoulder blades together. Do not raise your shoulders up as you squeeze. 2. Hold 6 seconds.   3. Repeat 8 to 12 times. Resisted rows    For this exercise, you will need elastic exercise material, such as surgicaltubing or Thera-Band. 1. Put the band around a solid object at about waist level. (A bedpost will work well.) Each hand should hold an end of the band. 2. With your elbows at your sides and bent to 90 degrees, pull the band back. Your shoulder blades should move toward each other. Return to the starting position. 3. Repeat 8 to 12 times. External rotator strengthening exercise    1. Start by tying a piece of elastic exercise material to a doorknob. You can use surgical tubing or Thera-Band. (You may also hold one end of the band in each hand.)  2. Stand or sit with your shoulder relaxed and your elbow bent 90 degrees. Your upper arm should rest comfortably against your side. Squeeze a rolled towel between your elbow and your body for comfort. This will help keep your arm at your side. 3. Hold one end of the elastic band with the hand of the painful arm. 4. Start with your forearm across your belly. Slowly rotate the forearm out away from your body. Keep your elbow and upper arm tucked against the towel roll or the side of your body until you begin to feel tightness in your shoulder. Slowly move your arm back to where you started. 5. Repeat 8 to 12 times. Internal rotator strengthening exercise    1. Start by tying a piece of elastic exercise material to a doorknob. You can use surgical tubing or Thera-Band. 2. Stand or sit with your shoulder relaxed and your elbow bent 90 degrees. Your upper arm should rest comfortably against your side. Squeeze a rolled towel between your elbow and your body for comfort. This will help keep your arm at your side. 3. Hold one end of the elastic band in the hand of the painful arm. 4. Slowly rotate your forearm toward your body until it touches your belly. Slowly move it back to where you started.   5. Keep your elbow and upper arm firmly tucked against the towel roll or at your side. 6. Repeat 8 to 12 times. Pendulum swing    If you have pain in your back, do not do this exercise. 1. Hold on to a table or the back of a chair with your good arm. Then bend forward a little and let your sore arm hang straight down. This exercise does not use the arm muscles. Rather, use your legs and your hips to create movement that makes your arm swing freely. 2. Use the movement from your hips and legs to guide the slightly swinging arm back and forth like a pendulum (or elephant trunk). Then guide it in circles that start small (about the size of a dinner plate). Make the circles a bit larger each day, as your pain allows. 3. Do this exercise for 5 minutes, 5 to 7 times each day. 4. As you have less pain, try bending over a little farther to do this exercise. This will increase the amount of movement at your shoulder. Follow-up care is a key part of your treatment and safety. Be sure to make and go to all appointments, and call your doctor if you are having problems. It's also a good idea to know your test results and keep alist of the medicines you take. Where can you learn more? Go to https://[employer]Plan.[employer]. org and sign in to your [employer] account. Enter H562 in the Magpower box to learn more about \"Shoulder Arthritis: Exercises. \"     If you do not have an account, please click on the \"Sign Up Now\" link. Current as of: March 9, 2022               Content Version: 13.3  © 2006-2022 [employer]. Care instructions adapted under license by [facility] ([facility]). If you have questions about a medical condition or this instruction, always ask your healthcare professional. [place] [address] any warranty or liability for your use of this information.

## 2022-06-29 ASSESSMENT — ENCOUNTER SYMPTOMS
SORE THROAT: 0
COUGH: 0
NAUSEA: 0
BACK PAIN: 0
WHEEZING: 0
SHORTNESS OF BREATH: 0
COLOR CHANGE: 0
VOMITING: 0
DIARRHEA: 0

## 2022-07-18 DIAGNOSIS — E78.5 HYPERLIPIDEMIA, UNSPECIFIED HYPERLIPIDEMIA TYPE: ICD-10-CM

## 2022-07-18 RX ORDER — ROSUVASTATIN CALCIUM 5 MG/1
TABLET, COATED ORAL
Qty: 90 TABLET | Refills: 0 | Status: SHIPPED | OUTPATIENT
Start: 2022-07-18

## 2022-08-10 ENCOUNTER — OFFICE VISIT (OUTPATIENT)
Dept: INTERNAL MEDICINE CLINIC | Age: 62
End: 2022-08-10
Payer: COMMERCIAL

## 2022-08-10 VITALS
BODY MASS INDEX: 29.48 KG/M2 | DIASTOLIC BLOOD PRESSURE: 68 MMHG | HEIGHT: 71 IN | SYSTOLIC BLOOD PRESSURE: 118 MMHG | WEIGHT: 210.6 LBS | HEART RATE: 61 BPM | OXYGEN SATURATION: 96 %
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
## 2022-08-10 PROCEDURE — 99212 OFFICE O/P EST SF 10 MIN: CPT

## 2022-08-10 SDOH — ECONOMIC STABILITY: FOOD INSECURITY: WITHIN THE PAST 12 MONTHS, THE FOOD YOU BOUGHT JUST DIDN'T LAST AND YOU DIDN'T HAVE MONEY TO GET MORE.: NEVER TRUE

## 2022-08-10 SDOH — ECONOMIC STABILITY: FOOD INSECURITY: WITHIN THE PAST 12 MONTHS, YOU WORRIED THAT YOUR FOOD WOULD RUN OUT BEFORE YOU GOT MONEY TO BUY MORE.: NEVER TRUE

## 2022-08-10 ASSESSMENT — ENCOUNTER SYMPTOMS
FACIAL SWELLING: 0
ABDOMINAL PAIN: 0
COUGH: 0
SORE THROAT: 0
DIARRHEA: 0
RHINORRHEA: 0
VOMITING: 0
NAUSEA: 0
CONSTIPATION: 0
SHORTNESS OF BREATH: 0
COLOR CHANGE: 0

## 2022-08-10 ASSESSMENT — SOCIAL DETERMINANTS OF HEALTH (SDOH): HOW HARD IS IT FOR YOU TO PAY FOR THE VERY BASICS LIKE FOOD, HOUSING, MEDICAL CARE, AND HEATING?: NOT HARD AT ALL

## 2022-08-10 NOTE — PROGRESS NOTES
Subjective:      Chief Complaint   Patient presents with    Other     Dog bite, wants to make ure infection is gone. Still sore     HPI:  Brian Zaragoza is a 65 y.o. male who presents today for follow-up regarding a dog bite sustained to left third digit MIP. [place] urgent care and emergency department. Patient was placed on 10-day course of Augmentin. Past Medical History:   Diagnosis Date    Depression     Exposure to viral disease     GERD with esophagitis     Glaucoma     Herpes labialis     Hyperlipidemia     Insomnia     Mild episode of recurrent major depressive disorder (HCC)     Osteoarthritis     Right sacral radiculopathy     Tarsal tunnel syndrome       Past Surgical History:   Procedure Laterality Date    COLONOSCOPY  04/09/2018    colon polyps, divertcula, Hem- Dr Rosana Deaner History     Tobacco Use    Smoking status: Never    Smokeless tobacco: Never   Substance Use Topics    Alcohol use: Never      Review of Systems   Constitutional:  Negative for fatigue and fever. HENT:  Negative for congestion, facial swelling, rhinorrhea and sore throat. Eyes:  Negative for visual disturbance. Respiratory:  Negative for cough and shortness of breath. Cardiovascular:  Negative for chest pain and leg swelling. Gastrointestinal:  Negative for abdominal pain, constipation, diarrhea, nausea and vomiting. Genitourinary:  Negative for difficulty urinating. Musculoskeletal:  Negative for myalgias. Skin:  Negative for color change. Neurological:  Negative for dizziness and syncope. Prior to Visit Medications    Medication Sig Taking?  Authorizing Provider   rosuvastatin (CRESTOR) 5 MG tablet TAKE 1 TABLET BY MOUTH EVERY DAY Yes Maribel Rush,    vitamin C (ASCORBIC ACID) 500 MG tablet Take 1,000 mg by mouth daily Yes Historical Provider, MD   sertraline (ZOLOFT) 100 MG tablet TAKE 1 TABLET BY MOUTH TWICE A DAY Yes Maribel Rush DO   acetaminophen (TYLENOL) 325 MG tablet Take 650 mg by mouth every 6 hours as needed for Pain Yes Historical Provider, MD   ibuprofen (ADVIL;MOTRIN) 200 MG tablet Take 200 mg by mouth every 6 hours as needed for Pain Yes Historical Provider, MD   VITAMIN D PO Take by mouth 5000IU  Patient not taking: Reported on 8/10/2022  Historical Provider, MD   valACYclovir (VALTREX) 1 g tablet Take 1 tablet by mouth daily  Patient not taking: Reported on 8/10/2022  Maribel Rush DO        Objective:      /68 (Site: Right Upper Arm, Position: Sitting, Cuff Size: Large Adult)   Pulse 61   Ht 5' 11\" (1.803 m)   Wt 210 lb 9.6 oz (95.5 kg)   SpO2 96%   BMI 29.37 kg/m²    Physical Exam  Vitals reviewed. Constitutional:       General: He is awake. Appearance: Normal appearance. He is well-developed. HENT:      Head: Normocephalic. Right Ear: External ear normal.      Left Ear: External ear normal.      Nose: Nose normal.      Mouth/Throat:      Mouth: Mucous membranes are moist.      Pharynx: Oropharynx is clear. Eyes:      Extraocular Movements: Extraocular movements intact. Conjunctiva/sclera: Conjunctivae normal.      Pupils: Pupils are equal, round, and reactive to light. Cardiovascular:      Rate and Rhythm: Normal rate and regular rhythm. Pulses: Normal pulses. Heart sounds: Normal heart sounds. Pulmonary:      Effort: Pulmonary effort is normal.      Breath sounds: Normal breath sounds. Abdominal:      General: Bowel sounds are normal.      Palpations: Abdomen is soft. Musculoskeletal:         General: Normal range of motion. Cervical back: Normal range of motion. Skin:     General: Skin is warm and dry. Capillary Refill: Capillary refill takes less than 2 seconds. Neurological:      General: No focal deficit present. Mental Status: He is alert and oriented to person, place, and time. Mental status is at baseline.       GCS: GCS eye subscore is 4. GCS verbal subscore is 5. GCS motor subscore is 6. Cranial Nerves: Cranial nerves are intact. Sensory: Sensation is intact. Motor: Motor function is intact. Coordination: Coordination is intact. Gait: Gait is intact. Psychiatric:         Mood and Affect: Mood normal.         Behavior: Behavior normal. Behavior is cooperative. Thought Content: Thought content normal.         Judgment: Judgment normal.      Assessment / Plan:      1. Dog bite, subsequent encounter  Patient has intact pulse, movement, sensation and cardinal hand functions. Skin has healed on left third digit with no drainage, opening, associated erythema or edema. Patient completed antibiotic treatment yesterday. Low concern for continued infection. Patient has existing lab orders due prior to appointment on 8/26/2022 which reported at today's appointment and provided to patient to have them drawn tomorrow as patient would like to see if white blood cell count is decreased from his ED visit over a week ago. Will inform patient of results. Patient agrees with plan of care and no questions at this time. Tdap ordered with labs in office for tomorrow as patient unsure of last dose and isn't fasting today for lipid panel. I have spent 18 minutes on this patient encounter. Patient voiced understanding and agreement with plan. All questions/concerns were addressed, risks/side effects of medications were reviewed. Return precautions and after visit summary were provided. Return if symptoms worsen or fail to improve. or earlier as needed. Please note this report has been produced using speech recognition software and may contain errors related to that system including errors in grammar, punctuation, and spelling, as well as words and phrases that may be inappropriate. If there are any questions or concerns please feel free to contact the dictating provider for clarification.     HERACLIO Banks - CNP

## 2022-08-11 ENCOUNTER — NURSE ONLY (OUTPATIENT)
Dept: INTERNAL MEDICINE CLINIC | Age: 62
End: 2022-08-11
Payer: COMMERCIAL

## 2022-08-11 DIAGNOSIS — L08.9 DOG BITE OF LEFT HAND WITH INFECTION, SUBSEQUENT ENCOUNTER: ICD-10-CM

## 2022-08-11 DIAGNOSIS — E78.5 HYPERLIPIDEMIA, UNSPECIFIED HYPERLIPIDEMIA TYPE: ICD-10-CM

## 2022-08-11 DIAGNOSIS — R73.01 IFG (IMPAIRED FASTING GLUCOSE): ICD-10-CM
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
## 2022-08-11 DIAGNOSIS — S61.452D DOG BITE OF LEFT HAND WITH INFECTION, SUBSEQUENT ENCOUNTER: ICD-10-CM

## 2022-08-11 DIAGNOSIS — Z23 NEED FOR TETANUS BOOSTER: Primary | ICD-10-CM
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
## 2022-08-11 LAB
ALBUMIN SERPL-MCNC: 4.2 G/DL (ref 3.4–5)
ALP BLD-CCNC: 69 U/L (ref 40–129)
ALT SERPL-CCNC: 17 U/L (ref 10–40)
AST SERPL-CCNC: 17 U/L (ref 15–37)
BASOPHILS ABSOLUTE: 0.1 K/UL (ref 0–0.2)
BASOPHILS RELATIVE PERCENT: 0.5 %
BILIRUB SERPL-MCNC: 0.4 MG/DL (ref 0–1)
BILIRUBIN DIRECT: <0.2 MG/DL (ref 0–0.3)
BILIRUBIN, INDIRECT: NORMAL MG/DL (ref 0–1)
CHOLESTEROL, TOTAL: 212 MG/DL (ref 0–199)
EOSINOPHILS ABSOLUTE: 0.2 K/UL (ref 0–0.6)
EOSINOPHILS RELATIVE PERCENT: 1.8 %
HCT VFR BLD CALC: 41.5 % (ref 40.5–52.5)
HDLC SERPL-MCNC: 29 MG/DL (ref 40–60)
HEMOGLOBIN: 14.2 G/DL (ref 13.5–17.5)
LDL CHOLESTEROL CALCULATED: 147 MG/DL
LYMPHOCYTES ABSOLUTE: 2.4 K/UL (ref 1–5.1)
LYMPHOCYTES RELATIVE PERCENT: 23.6 %
MCH RBC QN AUTO: 31 PG (ref 26–34)
MCHC RBC AUTO-ENTMCNC: 34.3 G/DL (ref 31–36)
MCV RBC AUTO: 90.5 FL (ref 80–100)
MONOCYTES ABSOLUTE: 0.7 K/UL (ref 0–1.3)
MONOCYTES RELATIVE PERCENT: 6.9 %
NEUTROPHILS ABSOLUTE: 6.9 K/UL (ref 1.7–7.7)
NEUTROPHILS RELATIVE PERCENT: 67.2 %
PDW BLD-RTO: 14.1 % (ref 12.4–15.4)
PLATELET # BLD: 234 K/UL (ref 135–450)
PMV BLD AUTO: 9.6 FL (ref 5–10.5)
RBC # BLD: 4.59 M/UL (ref 4.2–5.9)
TOTAL PROTEIN: 6.4 G/DL (ref 6.4–8.2)
TRIGL SERPL-MCNC: 179 MG/DL (ref 0–150)
VLDLC SERPL CALC-MCNC: 36 MG/DL
WBC # BLD: 10.2 K/UL (ref 4–11)

## 2022-08-11 PROCEDURE — 90715 TDAP VACCINE 7 YRS/> IM: CPT | Performed by: FAMILY MEDICINE

## 2022-08-11 PROCEDURE — 36415 COLL VENOUS BLD VENIPUNCTURE: CPT | Performed by: FAMILY MEDICINE

## 2022-08-11 PROCEDURE — 90471 IMMUNIZATION ADMIN: CPT | Performed by: FAMILY MEDICINE

## 2022-08-11 NOTE — PROGRESS NOTES
Pt here for labs and Tdap injection. Labs drawn without difficulty and Tdap given without difficulty.

## 2022-08-12 LAB
ESTIMATED AVERAGE GLUCOSE: 114 MG/DL
HBA1C MFR BLD: 5.6 %

## 2022-08-26 ENCOUNTER — OFFICE VISIT (OUTPATIENT)
Dept: INTERNAL MEDICINE CLINIC | Age: 62
End: 2022-08-26
Payer: COMMERCIAL

## 2022-08-26 VITALS
SYSTOLIC BLOOD PRESSURE: 130 MMHG | HEART RATE: 65 BPM | OXYGEN SATURATION: 97 % | BODY MASS INDEX: 29.68 KG/M2 | WEIGHT: 212 LBS | HEIGHT: 71 IN | DIASTOLIC BLOOD PRESSURE: 80 MMHG

## 2022-08-26 DIAGNOSIS — M54.50 CHRONIC RIGHT-SIDED LOW BACK PAIN, UNSPECIFIED WHETHER SCIATICA PRESENT: Primary | ICD-10-CM

## 2022-08-26 DIAGNOSIS — R21 EXCORIATED RASH: ICD-10-CM

## 2022-08-26 DIAGNOSIS — M51.36 DDD (DEGENERATIVE DISC DISEASE), LUMBAR: ICD-10-CM

## 2022-08-26 DIAGNOSIS — F51.01 PRIMARY INSOMNIA: ICD-10-CM

## 2022-08-26 DIAGNOSIS — F33.0 MILD EPISODE OF RECURRENT MAJOR DEPRESSIVE DISORDER (HCC): ICD-10-CM

## 2022-08-26 DIAGNOSIS — E78.5 HYPERLIPIDEMIA, UNSPECIFIED HYPERLIPIDEMIA TYPE: ICD-10-CM

## 2022-08-26 DIAGNOSIS — G89.29 CHRONIC RIGHT-SIDED LOW BACK PAIN, UNSPECIFIED WHETHER SCIATICA PRESENT: Primary | ICD-10-CM

## 2022-08-26 PROCEDURE — 99214 OFFICE O/P EST MOD 30 MIN: CPT | Performed by: FAMILY MEDICINE

## 2022-08-26 RX ORDER — CHLORAL HYDRATE 500 MG
3000 CAPSULE ORAL 3 TIMES DAILY
COMMUNITY

## 2022-08-26 RX ORDER — ZOLPIDEM TARTRATE 5 MG/1
5 TABLET ORAL NIGHTLY PRN
Qty: 30 TABLET | Refills: 2 | Status: SHIPPED | OUTPATIENT
Start: 2022-08-26 | End: 2022-09-25

## 2022-08-26 ASSESSMENT — ENCOUNTER SYMPTOMS
BACK PAIN: 1
ABDOMINAL PAIN: 0
SHORTNESS OF BREATH: 0
NAUSEA: 0
COUGH: 0

## 2022-08-26 NOTE — PROGRESS NOTES
Prudence Davalos (:  1960) is a 65 y.o. male,established patient, here for evaluation of the following chief complaint(s):  Follow-up (Pt states his back pain is getting worse /10 and may be because of his hips. ), Rash, and Insomnia         ASSESSMENT/PLAN:  1. Chronic right-sided low back pain, unspecified whether sciatica present  - MRI LUMBAR SPINE [address]; Future    2. Excoriated rash    _Start Bactroban  - mupirocin (BACTROBAN) 2 % ointment; Apply topically 3 times daily. Dispense: 22 g; Refill: 0  Avoid scratching. Cool compress     3. DDD (degenerative disc disease), lumbar  - MRI LUMBAR SPINE WO CONTRAST; Future    4. Primary insomnia  - zolpidem (AMBIEN) 5 MG tablet; Take 1 tablet by mouth nightly as needed for Sleep for up to 30 days. Dispense: 30 tablet; Refill: 2  Oarrs checked  ADR's explained    5. Mild episode of recurrent major depressive disorder ([facility] [address]  Continue Zoloft    6. Hyperlipidemia, unspecified hyperlipidemia type  Continue Crestor    Persist RTO or call  On this date 2022 I have spent 30 minutes reviewing previous notes, test results and face to face with the patient discussing the diagnosis and importance of compliance with the treatment plan as well as documenting on the day of the visit. Return in about 3 months (around 2022) for Depression, HLD.        Lab Results   Component Value Date    WBC 10.2 2022    HGB 14.2 2022    HCT 41.5 2022    MCV 90.5 2022     2022     Lab Results   Component Value Date    CHOL 212 (H) 2022     Lab Results   Component Value Date    TRIG 179 (H) 2022     Lab Results   Component Value Date    HDL 29 (L) 2022     Lab Results   Component Value Date    LDLCALC 147 (H) 2022     Lab Results   Component Value Date    LABA1C 5.6 2022     Lab Results   Component Value Date    .0 2022     Lab Results   Component Value Date     2022     05/07/2022    K 4.1 05/07/2022    K 4.1 05/07/2022     05/07/2022     05/07/2022    CO2 22 05/07/2022    CO2 22 05/07/2022    BUN 15 05/07/2022    BUN 17 05/07/2022    CREATININE 1.1 05/07/2022    CREATININE 1.1 05/07/2022    GLUCOSE 108 05/07/2022    GLUCOSE 108 05/07/2022    CALCIUM 9.3 05/07/2022    CALCIUM 9.3 05/07/2022    PROT 6.4 08/11/2022    LABALBU 4.2 08/11/2022    BILITOT 0.4 08/11/2022    ALKPHOS 69 08/11/2022    AST 17 08/11/2022    ALT 17 08/11/2022    LABGLOM 76 05/07/2022    GFRAA >60 05/14/2021     Lab Results   Component Value Date    TSH 3.4 05/07/2022 4/26/21  Impression   1. No acute findings in the lumbar spine, pelvis, or right hip. 2. Degenerative changes as above. Subjective   SUBJECTIVE/OBJECTIVE:    HISTORY OF PRESENT ILLNESS:  This is a 65 y.o. male here for the following:  Patient Active Problem List    Diagnosis Date Noted    Localized osteoarthritis of shoulder regions, bilateral 06/28/2022    IFG (impaired fasting glucose) 05/09/2022    Gastroesophageal reflux disease without esophagitis 02/08/2022    Glaucoma     Mild episode of recurrent major depressive disorder ([facility] [address]     Hyperlipidemia 12/07/2020    Herpes labialis     Primary insomnia 08/04/2020      Chronic LBP -R. He can notice in posterior R buttocks. He had physical therapy and symptoms improved but returned. Previous Xrays with DDD- lumbar. He has mild degenerative changes in the b/l hips. He feels symptoms have persisted. No new GI or  problems. No saddle anesthesia or weakness. Papule to R dorsum of hand- excoriated papules to L elbow. +Itchy. He has been scratching the area. No known exposures  Insomnia- stable on Ambien w/o SE  HLD- improving on Crestor  Depression- stable on Zoloft  He had a dog bite to his L 3rd finger about 1 month ago. He was breaking up a dog fight with his dog and he was bit.  He was treated with antibiotics and he has improved    Review of Systems   Constitutional: Negative for diaphoresis and fever. Respiratory:  Negative for cough and shortness of breath. Cardiovascular:  Negative for chest pain and palpitations. Gastrointestinal:  Negative for abdominal pain and nausea. Genitourinary:  Negative for difficulty urinating. Musculoskeletal:  Positive for arthralgias and back pain. Skin:  Positive for rash. Neurological:  Negative for dizziness, weakness, numbness and headaches. Psychiatric/Behavioral:  Negative for dysphoric mood. Allergies   Allergen Reactions    Demerol     Simvastatin      Cramps     Current Outpatient Medications   Medication Sig Dispense Refill    Zinc Sulfate (ZINC 15 PO) Take by mouth Unsure of dosage      SELENIUM PO Take by mouth      Omega-3 Fatty Acids (FISH OIL) 1000 MG CAPS Take 3,000 mg by mouth 3 times daily      Multiple Vitamins-Minerals (MULTI ADULT GUMMIES PO) Take by mouth      mupirocin (BACTROBAN) 2 % ointment Apply topically 3 times daily. 22 g 0    zolpidem (AMBIEN) 5 MG tablet Take 1 tablet by mouth nightly as needed for Sleep for up to 30 days. 30 tablet 2    rosuvastatin (CRESTOR) 5 MG tablet TAKE 1 TABLET BY MOUTH EVERY DAY 90 tablet 0    vitamin C (ASCORBIC ACID) 500 MG tablet Take 1,000 mg by mouth daily      sertraline (ZOLOFT) 100 MG tablet TAKE 1 TABLET BY MOUTH TWICE A  tablet 0    acetaminophen (TYLENOL) 325 MG tablet Take 650 mg by mouth every 6 hours as needed for Pain      ibuprofen (ADVIL;MOTRIN) 200 MG tablet Take 200 mg by mouth every 6 hours as needed for Pain      valACYclovir (VALTREX) 1 g tablet Take 1 tablet by mouth daily (Patient not taking: No sig reported) 30 tablet 3     No current facility-administered medications for this visit. Vitals:    08/26/22 1108   BP: 130/80   Site: Left Upper Arm   Position: Sitting   Cuff Size: Medium Adult   Pulse: 65   SpO2: 97%   Weight: 212 lb (96.2 kg)   Height: 5' 11\" (1.803 m)     Objective   Physical Exam  Vitals reviewed. Constitutional:       General: He is not in acute distress. Cardiovascular:      Rate and Rhythm: Normal rate and regular rhythm. Pulmonary:      Effort: Pulmonary effort is normal. No respiratory distress. Breath sounds: Normal breath sounds. Abdominal:      Palpations: Abdomen is soft. Tenderness: There is no abdominal tenderness. Musculoskeletal:         General: Tenderness (R lower back into R buttocks) present. Cervical back: Neck supple. Right lower leg: No edema. Left lower leg: No edema. Skin:     Findings: Rash (L elbow with excoriated papules. Dorsum to R hand with small excoriated papule) present. Neurological:      Mental Status: He is alert and oriented to person, place, and time. Sensory: No sensory deficit. Motor: No weakness. Psychiatric:         Mood and Affect: Mood normal.              An electronic signature was used to authenticate this note. --[place], DO     This dictation was generated by voice recognition computer software. Although all attempts are made to edit the dictation for accuracy, there may be errors in the transcription that are not intended.

## 2022-09-08 ENCOUNTER — HOSPITAL ENCOUNTER (OUTPATIENT)
Dept: MRI IMAGING | Age: 62
Discharge: HOME | End: 2022-09-08
Payer: COMMERCIAL

## 2022-09-08 DIAGNOSIS — M51.36 DDD (DEGENERATIVE DISC DISEASE), LUMBAR: ICD-10-CM

## 2022-09-08 DIAGNOSIS — M54.50 CHRONIC RIGHT-SIDED LOW BACK PAIN, UNSPECIFIED WHETHER SCIATICA PRESENT: ICD-10-CM

## 2022-09-08 DIAGNOSIS — G89.29 CHRONIC RIGHT-SIDED LOW BACK PAIN, UNSPECIFIED WHETHER SCIATICA PRESENT: ICD-10-CM

## 2022-09-08 PROCEDURE — 72148 MRI LUMBAR SPINE W/O DYE: CPT

## 2022-09-09 ENCOUNTER — TELEPHONE (OUTPATIENT)
Dept: INTERNAL MEDICINE CLINIC | Age: 62
End: 2022-09-09

## 2022-09-09 DIAGNOSIS — M54.50 CHRONIC RIGHT-SIDED LOW BACK PAIN, UNSPECIFIED WHETHER SCIATICA PRESENT: ICD-10-CM

## 2022-09-09 DIAGNOSIS — G89.29 CHRONIC RIGHT-SIDED LOW BACK PAIN, UNSPECIFIED WHETHER SCIATICA PRESENT: ICD-10-CM

## 2022-09-09 DIAGNOSIS — M51.26 LUMBAR HERNIATED DISC: Primary | ICD-10-CM

## 2022-09-09 RX ORDER — HYDROCODONE BITARTRATE AND ACETAMINOPHEN 5; 325 MG/1; MG/1
1 TABLET ORAL EVERY 6 HOURS PRN
Qty: 12 TABLET | Refills: 0 | Status: SHIPPED | OUTPATIENT
Start: 2022-09-09 | End: 2022-10-14 | Stop reason: SDUPTHER

## 2022-09-09 NOTE — TELEPHONE ENCOUNTER
Fax referral to Dr. Luis Enrique Leach- Neurosurgery. Send MRI, Xray lumbar  Discussed MRI results and his back pain. Dicussed medication. Norco sent in for pain. Oarrs checked. Ada Roa He would like to see Dr. Luis Enrique Leach.  Referral ordered

## 2022-09-28 ENCOUNTER — OFFICE VISIT (OUTPATIENT)
Dept: ORTHOPEDIC SURGERY | Age: 62
End: 2022-09-28
Payer: COMMERCIAL

## 2022-09-28 VITALS — HEART RATE: 63 BPM | OXYGEN SATURATION: 96 % | SYSTOLIC BLOOD PRESSURE: 134 MMHG | DIASTOLIC BLOOD PRESSURE: 81 MMHG

## 2022-09-28 DIAGNOSIS — M54.16 LUMBAR RADICULOPATHY: Primary | ICD-10-CM

## 2022-09-28 PROCEDURE — 99213 OFFICE O/P EST LOW 20 MIN: CPT | Performed by: STUDENT IN AN ORGANIZED HEALTH CARE EDUCATION/TRAINING PROGRAM

## 2022-09-28 RX ORDER — GABAPENTIN 100 MG/1
100 CAPSULE ORAL 2 TIMES DAILY
Qty: 60 CAPSULE | Refills: 0 | Status: SHIPPED | OUTPATIENT
Start: 2022-09-28 | End: 2022-10-04

## 2022-09-28 RX ORDER — CYCLOBENZAPRINE HCL 5 MG
5 TABLET ORAL 2 TIMES DAILY PRN
Qty: 30 TABLET | Refills: 0 | Status: SHIPPED | OUTPATIENT
Start: 2022-09-28 | End: 2022-10-08

## 2022-09-28 NOTE — PROGRESS NOTES
Pt is here for a follow up for bilate shoulder pain. Pt states steroid pack didn't help with the pain. Pt has been taking a muscle relaxer that only works for an hour or 2. Pt had castillo MRI done 9/8/2022. Family dr ordered it.

## 2022-09-28 NOTE — PROGRESS NOTES
9/28/2022   Chief Complaint   Patient presents with    Follow-up        History of Present Illness:                             Kemi Farah is a 65 y.o. male seen myself 3 months prior for bilateral shoulder pain and was scheduled to be seen today as follow-up for this but actually is preferring to be seen for leg pain. Patient states that his upper extremity issues are not bothersome at this time. He states that over the last month he has had worsening back pain as well as right thigh pain. He was seen by his primary care physician who ordered an MRI and is here today discuss MRI results as well. He states that the Medrol Dosepak that I provided him at his previous visit in June provided minimal relief to his shoulders but again he denies any shoulder issues at this time. He has done no recent Medrol Dosepak from the PCP for his low back issues. He states he is essentially been on bedrest for the last several weeks due to the back pain. He states has had several episodes of this in the past.  He denies any numbness or tingling lower extremities or any type of cauda equina type syndrome symptoms. Patient has recent MRI of his lumbar spine performed. The pain's location is anterior medial thigh right leg. he describes the symptoms as aching, sharp and stabbing. Symptoms improve with lying flat and resting. Symptoms worsen with increased activity    Patient denies prior injury to hip, denies numbness, tingling, fever, chills. Continues to also complain of lumbar back pain    Denies swelling or effusions. No prominent mechanical symptoms. Denies sensation of instability. Worse with increased activity. Better with rest.    Treatment thus far has included rest, activity modifications, oral medications including Medrol Dosepak without significant relief. Here today to discuss diagnosis and treatment options. Is affecting ADLs.   Pain is 10/10 at it's worst.    Patient denies any previous surgery to the hip or lumbar spine. Outside reports reviewed: MRI lumbar spine without contrast    Patient's medications, allergies, past medical, surgical, social and family histories were reviewed and updated as appropriate. Medical History  Patient's medications, allergies, past medical, surgical, social and family histories were reviewed and updated as appropriate. Past Medical History:   Diagnosis Date    Depression     Exposure to viral disease     GERD with esophagitis     Glaucoma     Herpes labialis     Hyperlipidemia     Insomnia     Mild episode of recurrent major depressive disorder (HCC)     Osteoarthritis     Right sacral radiculopathy     Tarsal tunnel syndrome      Past Surgical History:   Procedure Laterality Date    COLONOSCOPY  04/09/2018    colon polyps, divertcula, Hem- Dr Daron Stone       No family history on file.   Social History     Socioeconomic History    Marital status:     Number of children: 4   Tobacco Use    Smoking status: Never    Smokeless tobacco: Never   Substance and Sexual Activity    Alcohol use: Never    Drug use: Yes     Types: Marijuana (Weed)     Social Determinants of Health     Financial Resource Strain: Low Risk     Difficulty of Paying Living Expenses: Not hard at all   Food Insecurity: No Food Insecurity    Worried About [facility] in the Last Year: Never true    Ran Out of Food in the Last Year: Never true     Current Outpatient Medications   Medication Sig Dispense Refill    Zinc Sulfate (ZINC 15 PO) Take by mouth Unsure of dosage      SELENIUM PO Take by mouth      Omega-3 Fatty Acids (FISH OIL) 1000 MG CAPS Take 3,000 mg by mouth 3 times daily      Multiple Vitamins-Minerals (MULTI ADULT GUMMIES PO) Take by mouth      rosuvastatin (CRESTOR) 5 MG tablet TAKE 1 TABLET BY MOUTH EVERY DAY 90 tablet 0    vitamin C (ASCORBIC ACID) 500 MG tablet Take 1,000 mg by mouth daily      sertraline (ZOLOFT) 100 MG tablet TAKE 1 TABLET BY MOUTH TWICE A  tablet 0    acetaminophen (TYLENOL) 325 MG tablet Take 650 mg by mouth every 6 hours as needed for Pain      ibuprofen (ADVIL;MOTRIN) 200 MG tablet Take 200 mg by mouth every 6 hours as needed for Pain      valACYclovir (VALTREX) 1 g tablet Take 1 tablet by mouth daily 30 tablet 3     No current facility-administered medications for this visit. Allergies   Allergen Reactions    Demerol     Simvastatin      Cramps         Review of Systems   Constitutional:  Positive for activity change. Negative for appetite change, fatigue, fever and unexpected weight change. HENT:  Negative for drooling, hearing loss, sneezing, sore throat and trouble swallowing. Eyes:  Negative for visual disturbance. Respiratory:  Negative for chest tightness, shortness of breath, wheezing and stridor. Cardiovascular:  Negative for palpitations and leg swelling. Endocrine: Negative for cold intolerance and heat intolerance. Musculoskeletal:  Positive for back pain, gait problem and myalgias. Negative for arthralgias, joint swelling, neck pain and neck stiffness. Skin:  Negative for color change, rash and wound. Neurological:  Negative for dizziness, speech difficulty, weakness, light-headedness, numbness and headaches. Psychiatric/Behavioral:  Negative for agitation, behavioral problems and confusion. The patient is not hyperactive. Examination:  General Exam:  Vitals: /81 (Site: Right Upper Arm, Position: Supine, Cuff Size: Large Adult)   Pulse 63   SpO2 96%    Physical Exam  Constitutional:       General: He is in acute distress. Appearance: Normal appearance. He is normal weight. HENT:      Head: Normocephalic and atraumatic. Nose: Nose normal.      Mouth/Throat:      Mouth: Mucous membranes are moist.      Pharynx: Oropharynx is clear. Eyes:      General:         Right eye: No discharge. Left eye: No discharge. Cardiovascular:      Rate and Rhythm: Normal rate and regular rhythm. Pulmonary:      Effort: Pulmonary effort is normal.      Breath sounds: Normal breath sounds. Musculoskeletal:         General: Tenderness present. No swelling, deformity or signs of injury. Cervical back: Normal and normal range of motion. Thoracic back: Normal.      Lumbar back: Tenderness present. No swelling, edema, deformity, signs of trauma, lacerations, spasms or bony tenderness. Decreased range of motion. Negative right straight leg raise test and negative left straight leg raise test.      Right hip: Normal.      Left hip: Normal.      Right upper leg: Normal.      Left upper leg: Normal.      Right knee: Normal.      Left knee: Normal.      Right lower leg: Normal. No edema. Left lower leg: Normal. No edema. Right ankle: Normal.      Left ankle: Normal.   Skin:     General: Skin is warm and dry. Capillary Refill: Capillary refill takes less than 2 seconds. Neurological:      General: No focal deficit present. Mental Status: He is alert and oriented to person, place, and time. Motor: No weakness.    Psychiatric:         Mood and Affect: Mood normal.         Behavior: Behavior normal.      RIGHT HIP EXAMINATION:      OBSERVATION / INSPECTION    Gait: Nonantalgic but difficult due to back pain    Alignment: Neutral     Scars: None     Muscle atrophy: None     Effusion: None     Warmth: None     Discoloration: None     Leg lengths: Equal     Pelvis: Level       TENDERNESS / CREPITUS (T/C):    T / C    Trochanteric bursa - / -    Piriformis - / -    SI joint - / -    Psoas tendon - / -    Rectus insertion - / -    Adductor insertion - / -    Pubic symphysis - / -      ROM: (* = pain)      Flexion:  120 degrees    External rotation: 40 degrees    Internal rotation with axial load: 30 degrees    Internal rotation without axial load: 40 degrees    Abduction: 45 degrees    Adduction: 20 degrees      SPECIAL TESTS:    Log roll: Negative     Trendelenburg test: Negative     Straight leg raise: Negative      EXTREMITY NEURO-VASCULAR EXAMINATION:     Sensation:  Grossly intact to light touch all dermatomal regions. Motor Function:  Fully intact motor function at hip, knee, foot and ankle      DTRs;  quadriceps and  achilles 2+. No clonus and downgoing Babinski. Vascular status:  DP and PT pulses 2+, brisk capillary refill, symmetric. Skin: intact, compartments soft. Diagnostic testing:  MRI images of lumbar spine were reviewed by myself and discussed with the patient:  BONES/ALIGNMENT: There is loss of the normal lordotic curvature. The   vertebral body heights are maintained. The bone marrow signal appears   unremarkable. SPINAL CORD: The conus terminates normally. SOFT TISSUES: No paraspinal mass identified. L1-L2: There is no significant disc herniation, spinal canal stenosis or   neural foraminal narrowing. L2-L3: Diffuse disc bulge along with facet arthropathy resulting in mild   canal stenosis and mild bilateral foraminal stenosis. Superimposed right   subarticular disc extrusion with slight caudal migration dorsal to the L3   superior endplate. L3-L4: Diffuse disc bulge along with facet arthropathy resulting in mild   canal stenosis and mild bilateral foraminal stenosis. L4-L5: Diffuse disc bulge along with facet arthropathy resulting in mild   canal stenosis and mild bilateral foraminal stenosis. L5-S1: Mild bilateral foraminal stenosis related to disc bulge and facet   arthropathy. No significant canal stenosis. Office Procedures:  No orders of the defined types were placed in this encounter. Assessment and Plan    A: Lumbar radiculopathy    P:   I had a thorough conversation with the patient regarding his MRI findings as well as his treatment course.   I explained that this is not something I take care of but patient cuate assure me that he has an appointment set up with Dr. Salcido Alu and 2 weeks. I told him to maintain his appointment. I did provide him with a prescription for gabapentin as well as Flexeril at this time to help with his issues as he is already given a Medrol Dosepak which I would normally prescribe. He will follow-up with me as needed. I did warn him about concerning signs for cauda equina and to go to the emergency room medially if any these occur but I explained to him that I have a low suspicion that this is happening as his MRI is not suggestive of this being an impending issue.   All questions were answered and patient voices understanding    Electronically signed by Leandra Duke DO on 9/28/2022 at 3:06 PM

## 2022-09-29 ASSESSMENT — ENCOUNTER SYMPTOMS
STRIDOR: 0
TROUBLE SWALLOWING: 0
CHEST TIGHTNESS: 0
WHEEZING: 0
SORE THROAT: 0
BACK PAIN: 1
SHORTNESS OF BREATH: 0
COLOR CHANGE: 0

## 2022-10-04 ENCOUNTER — OFFICE VISIT (OUTPATIENT)
Dept: INTERNAL MEDICINE CLINIC | Age: 62
End: 2022-10-04
Payer: COMMERCIAL

## 2022-10-04 ENCOUNTER — TELEPHONE (OUTPATIENT)
Dept: INTERNAL MEDICINE CLINIC | Age: 62
End: 2022-10-04

## 2022-10-04 VITALS
HEART RATE: 80 BPM | DIASTOLIC BLOOD PRESSURE: 82 MMHG | BODY MASS INDEX: 29.57 KG/M2 | SYSTOLIC BLOOD PRESSURE: 126 MMHG | OXYGEN SATURATION: 99 % | HEIGHT: 71 IN

## 2022-10-04 DIAGNOSIS — M54.16 LUMBAR RADICULOPATHY: ICD-10-CM

## 2022-10-04 DIAGNOSIS — R42 VERTIGO: Primary | ICD-10-CM

## 2022-10-04 DIAGNOSIS — R11.0 NAUSEA: ICD-10-CM

## 2022-10-04 PROCEDURE — 99213 OFFICE O/P EST LOW 20 MIN: CPT | Performed by: FAMILY MEDICINE

## 2022-10-04 RX ORDER — ONDANSETRON 4 MG/1
4 TABLET, FILM COATED ORAL 3 TIMES DAILY PRN
Qty: 15 TABLET | Refills: 0 | Status: SHIPPED | OUTPATIENT
Start: 2022-10-04

## 2022-10-04 RX ORDER — MECLIZINE HYDROCHLORIDE 25 MG/1
25 TABLET ORAL 3 TIMES DAILY PRN
Qty: 30 TABLET | Refills: 0 | Status: SHIPPED | OUTPATIENT
Start: 2022-10-04 | End: 2022-10-14

## 2022-10-04 ASSESSMENT — ENCOUNTER SYMPTOMS
RHINORRHEA: 1
NAUSEA: 0
SHORTNESS OF BREATH: 0
DIARRHEA: 0
CONSTIPATION: 0
COUGH: 0
BACK PAIN: 1
ABDOMINAL PAIN: 0

## 2022-10-04 NOTE — TELEPHONE ENCOUNTER
He will need to be evaluated for this condition Vertigo? 1145 or in the afternoon. He can also go to the [facility] ( He will have to call first).

## 2022-10-04 NOTE — PROGRESS NOTES
Esmer Thomas (:  1960) is a 65 y.o. male,established patient, here for evaluation of the following chief complaint(s):  Dizziness and Back Pain (Pinched nerve. Was given gabapentin but made his ears ring. Quit taking it  )         ASSESSMENT/PLAN:  1. Vertigo  -Start:  - meclizine (ANTIVERT) 25 MG tablet; Take 1 tablet by mouth 3 times daily as needed for Dizziness  Dispense: 30 tablet; Refill: 0    2. Nausea  -Start:  - ondansetron (ZOFRAN) 4 MG tablet; Take 1 tablet by mouth 3 times daily as needed for Nausea or Vomiting  Dispense: 15 tablet; Refill: 0    3. Lumbar radiculopathy  Pt stopped gabapentin  Pt to see Dr. Abhishek Anthony    Declines  brain imaging  Declines ER  Return if symptoms worsen or fail to improve.        Lab Results   Component Value Date    WBC 10.2 2022    HGB 14.2 2022    HCT 41.5 2022    MCV 90.5 2022     2022     Lab Results   Component Value Date    CHOL 212 (H) 2022     Lab Results   Component Value Date    TRIG 179 (H) 2022     Lab Results   Component Value Date    HDL 29 (L) 2022     Lab Results   Component Value Date    LDLCALC 147 (H) 2022     Lab Results   Component Value Date    LABA1C 5.6 2022     Lab Results   Component Value Date    .0 2022     Lab Results   Component Value Date     2022     2022    K 4.1 2022    K 4.1 2022     2022     2022    CO2 22 2022    CO2 22 2022    BUN 15 2022    BUN 17 2022    CREATININE 1.1 2022    CREATININE 1.1 2022    GLUCOSE 108 2022    GLUCOSE 108 2022    CALCIUM 9.3 2022    CALCIUM 9.3 2022    PROT 6.4 2022    LABALBU 4.2 2022    BILITOT 0.4 2022    ALKPHOS 69 2022    AST 17 2022    ALT 17 2022    LABGLOM 76 2022    GFRAA >60 2021     Lab Results   Component Value Date    TSH 3.4 2022     Lab Results   Component Value Date/Time    COLORU Yellow 05/14/2021 12:00 AM    LABSPEC 1.047 05/14/2021 12:00 AM    NITRU Negative 05/14/2021 12:00 AM    GLUCOSEU Negative 05/14/2021 12:00 AM    KETUA Negative 05/14/2021 12:00 AM    UROBILINOGEN Normal 05/14/2021 12:00 AM    BILIRUBINUR Negative 05/14/2021 12:00 AM       Subjective   SUBJECTIVE/OBJECTIVE:    HISTORY OF PRESENT ILLNESS:  This is a 65 y.o. male here for the following:  Patient Active Problem List    Diagnosis Date Noted    Localized osteoarthritis of shoulder regions, bilateral 06/28/2022    IFG (impaired fasting glucose) 05/09/2022    Gastroesophageal reflux disease without esophagitis 02/08/2022    Glaucoma     Mild episode of recurrent major depressive disorder ([facility] [address]     Hyperlipidemia 12/07/2020    Herpes labialis     Primary insomnia 08/04/2020      Symptoms started last night. He leaned over to feed the dogs, and when he came up his head was spinning. This continued until he went to bed. He woke up, and still has dizziness. Some ringing in the ears. He felt nauseated and threw up. No diarrhea or known fever. When he lays on his back,  the dizziness increases. No headache. Symptoms for one day  Lumbar radiculopathy-Patient to see Dr. Rani De La Garza on Monday. He stopped gabapentin ordered by Dr. Silva Hyatt due to SE    Review of Systems   Constitutional:  Negative for diaphoresis and fever. HENT:  Positive for rhinorrhea. Negative for ear pain, hearing loss and sore throat. Respiratory:  Negative for cough and shortness of breath. Cardiovascular:  Negative for chest pain and palpitations. Gastrointestinal:  Negative for abdominal pain, constipation, diarrhea and nausea. Genitourinary:  Negative for difficulty urinating. Musculoskeletal:  Positive for back pain and gait problem (slightly off balance). Neurological:  Positive for dizziness. Negative for numbness and headaches.      Allergies   Allergen Reactions    Demerol     Simvastatin Cramps     Current Outpatient Medications   Medication Sig Dispense Refill    ondansetron (ZOFRAN) 4 MG tablet Take 1 tablet by mouth 3 times daily as needed for Nausea or Vomiting 15 tablet 0    meclizine (ANTIVERT) 25 MG tablet Take 1 tablet by mouth 3 times daily as needed for Dizziness 30 tablet 0    cyclobenzaprine (FLEXERIL) 5 MG tablet Take 1 tablet by mouth 2 times daily as needed for Muscle spasms 30 tablet 0    Zinc Sulfate (ZINC 15 PO) Take by mouth Unsure of dosage      SELENIUM PO Take by mouth      Omega-3 Fatty Acids (FISH OIL) 1000 MG CAPS Take 3,000 mg by mouth 3 times daily      Multiple Vitamins-Minerals (MULTI ADULT GUMMIES PO) Take by mouth      rosuvastatin (CRESTOR) 5 MG tablet TAKE 1 TABLET BY MOUTH EVERY DAY 90 tablet 0    vitamin C (ASCORBIC ACID) 500 MG tablet Take 1,000 mg by mouth daily      sertraline (ZOLOFT) 100 MG tablet TAKE 1 TABLET BY MOUTH TWICE A  tablet 0    acetaminophen (TYLENOL) 325 MG tablet Take 650 mg by mouth every 6 hours as needed for Pain      ibuprofen (ADVIL;MOTRIN) 200 MG tablet Take 200 mg by mouth every 6 hours as needed for Pain      valACYclovir (VALTREX) 1 g tablet Take 1 tablet by mouth daily 30 tablet 3     No current facility-administered medications for this visit. Vitals:    10/04/22 1113   BP: 126/82   Site: Right Upper Arm   Position: Supine   Cuff Size: Medium Adult   Pulse: 80   SpO2: 99%   Height: 5' 11\" (1.803 m)     Objective   Physical Exam  Vitals reviewed. Eyes:      Extraocular Movements: Extraocular movements intact. Cardiovascular:      Rate and Rhythm: Normal rate and regular rhythm. Pulmonary:      Effort: Pulmonary effort is normal. No respiratory distress. Breath sounds: Normal breath sounds. Abdominal:      Palpations: Abdomen is soft. Tenderness: There is no abdominal tenderness. Musculoskeletal:         General: Tenderness (lower back) present. Cervical back: Neck supple.       Right lower leg: No edema. Left lower leg: No edema. Neurological:      Mental Status: He is alert and oriented to person, place, and time. Cranial Nerves: No cranial nerve deficit. Motor: No weakness. Gait: Gait abnormal.   Psychiatric:         Mood and Affect: Mood normal.              An electronic signature was used to authenticate this note. --Simone Jurado DO     This dictation was generated by voice recognition computer software. Although all attempts are made to edit the dictation for accuracy, there may be errors in the transcription that are not intended.

## 2022-10-07 ASSESSMENT — ENCOUNTER SYMPTOMS: SORE THROAT: 0

## 2022-10-13 ENCOUNTER — TELEPHONE (OUTPATIENT)
Dept: INTERNAL MEDICINE CLINIC | Age: 62
End: 2022-10-13

## 2022-10-13 ENCOUNTER — HOSPITAL ENCOUNTER (OUTPATIENT)
Dept: PHYSICAL THERAPY | Age: 62
Setting detail: THERAPIES SERIES
Discharge: HOME | End: 2022-10-13
Payer: COMMERCIAL

## 2022-10-13 DIAGNOSIS — M51.26 LUMBAR HERNIATED DISC: ICD-10-CM

## 2022-10-13 PROCEDURE — 97161 PT EVAL LOW COMPLEX 20 MIN: CPT

## 2022-10-13 PROCEDURE — 97110 THERAPEUTIC EXERCISES: CPT

## 2022-10-13 ASSESSMENT — PAIN DESCRIPTION - DESCRIPTORS: DESCRIPTORS: ACHING;SHARP;SORE;SHOOTING

## 2022-10-13 ASSESSMENT — PAIN DESCRIPTION - FREQUENCY: FREQUENCY: INTERMITTENT

## 2022-10-13 ASSESSMENT — PAIN DESCRIPTION - PAIN TYPE: TYPE: ACUTE PAIN

## 2022-10-13 ASSESSMENT — PAIN - FUNCTIONAL ASSESSMENT: PAIN_FUNCTIONAL_ASSESSMENT: PREVENTS OR INTERFERES SOME ACTIVE ACTIVITIES AND ADLS

## 2022-10-13 ASSESSMENT — PAIN DESCRIPTION - LOCATION: LOCATION: BACK;HIP

## 2022-10-13 ASSESSMENT — PAIN SCALES - GENERAL: PAINLEVEL_OUTOF10: 8

## 2022-10-13 ASSESSMENT — PAIN DESCRIPTION - ORIENTATION: ORIENTATION: LEFT;RIGHT

## 2022-10-13 ASSESSMENT — PAIN DESCRIPTION - ONSET: ONSET: AWAKENED FROM SLEEP

## 2022-10-13 NOTE — PLAN OF CARE
Outpatient Physical Therapy           [place]           [] Phone: [phone]   Fax: [phone]  [facility]           [] Phone: [phone]   Fax: [phone]     To: MD Dr. Connor Galeano   From: Melissa Gonzalez, PT, PT     Patient: Archie Montgomery       : 1960  Diagnosis: Other intervertebral disc displacement, lumbar region [M51.26] Diagnosis: herniation  Treatment Diagnosis: Decreased actviity tolerance limited by LBP  Date: 10/13/2022    Physical Therapy Certification/Re-Certification Form  Dear Dr. Connor Morales,   The following patient has been evaluated for physical therapy services and for therapy to continue, insurance requires physician review of the treatment plan initially and every 90 days. Please review the attached evaluation and/or summary of the patient's plan of care, and verify that you agree therapy should continue by signing the attached document and sending it back to our office. Assessment:    Assessment: Pt is 65year old male with 2-3 week onset of low back pain without MIRTHA. Previously attended therapy for bilateral hip pain after a fall last year, but no previous spinal injuries. Had MRI completed that displayed an L2/3 disc protrusion, by according to the patient the doctor has noted bone spurring as well (unable to specify region). Pt now has difficulties standing/sitting and only finds relief with supine activities. Pt demo deficits this date that include poor PPT and stability, decreases in hip/gluteal strength, able to centralize symptoms with prone propping, poor lumbar spine joint mobilization. Pt will benefit with PT services with core stability, pain management, STM/manual, centralization techniques, progressive BLE strengthening to return to PLOF. Pt prior to onset of current condition had min/no pain with able to complete full ADLs and work activities. Will trial therapy for pain management and strength programing.     Plan of Care/Treatment to date:  [x] Therapeutic Exercise  [] Modalities:  [x] Therapeutic Activity     [] Ultrasound  [] Electrical Stimulation  [x] Gait Training      [] Cervical Traction [] Lumbar Traction  [x] Neuromuscular Re-education    [] Cold/hotpack [] Iontophoresis   [x] Instruction in HEP      [] Vasopneumatic    [] Dry Needling  [x] Manual Therapy               [] Aquatic Therapy       Other:          Frequency/Duration:  # Days per week: [] 1 day # Weeks: [] 1 week [] 5 weeks     [x] 2 days   [] 2 weeks [] 6 weeks     [] 3 days   [] 3 weeks [] 7 weeks     [] 4 days   [x] 4 weeks [] 8 weeks         [] 9 weeks [] 10 weeks         [] 11 weeks [] 12 weeks    Rehab Potential/Progress: [] Excellent [x] Good [] Fair  [] Poor     Goals:    Patient goals: improve pain  Short term goals  Time Frame for Short term goals: 4 weeks  Pt demo I w/ HEP and ability to centralize his symptoms  Pt reports 25% improvement in symptoms to improve tolerance to ADL's  Pt demo ability to complete 5 sit to stands without UE assist and pain reported <5/10  Pt demo ability to ambulate >800 ft in 6 minutes and pain reported <5/10      Electronically signed by:  Ara Nash PT, DPT, 10/13/2022, 12:09 PM        If you have any questions or concerns, please don't hesitate to call.   Thank you for your referral.      Physician Signature:________________________________Date:_________ TIME: _____  By signing above, therapists plan is approved by physician

## 2022-10-13 NOTE — PROGRESS NOTES
Physical Therapy: Initial Evaluation    Patient: Edward Braswell (85 y.o. male)   Examination Date:   Plan of Care Certification XQCVRH: to        :  1960 ;    Confirmed: Yes MRN: [id number]  CSN: [id number]   Insurance: Payor: [facility] / Plan: R  / Product Type: *No Product type* /   Insurance ID: [id number] - (Commercial) Secondary Insurance (if applicable):    Referring Physician: MD Dr. Carolyn Monterroso   PCP: Amarilys Funes DO Visits to Date/Visits Approved:   /      No Show/Cancelled Appts:   /       Medical Diagnosis: Other intervertebral disc displacement, lumbar region [M51.26] herniation  Treatment Diagnosis: Decreased actviity tolerance limited by LBP     PERTINENT MEDICAL HISTORY   Patient Assessed for Rehabilitation Services: Yes  Self reported health status[de-identified] Good    Medical History: Chart Reviewed: Yes    Past Medical History:   Diagnosis Date    Depression     Exposure to viral disease     GERD with esophagitis     Glaucoma     Herpes labialis     Hyperlipidemia     Insomnia     Mild episode of recurrent major depressive disorder ([facility] [address]     Osteoarthritis     Right sacral radiculopathy     Tarsal tunnel syndrome      Surgical History:   Past Surgical History:   Procedure Laterality Date    COLONOSCOPY  2018    colon polyps, divertcula, Hem- Dr Ha Flowers         Medications:   Current Outpatient Medications:     ondansetron (ZOFRAN) 4 MG tablet, Take 1 tablet by mouth 3 times daily as needed for Nausea or Vomiting, Disp: 15 tablet, Rfl: 0    meclizine (ANTIVERT) 25 MG tablet, Take 1 tablet by mouth 3 times daily as needed for Dizziness, Disp: 30 tablet, Rfl: 0    Zinc Sulfate (ZINC 15 PO), Take by mouth Unsure of dosage, Disp: , Rfl:     SELENIUM PO, Take by mouth, Disp: , Rfl:     Omega-3 Fatty Acids (FISH OIL) 1000 MG CAPS, Take 3,000 mg by mouth 3 times daily, Disp: , Rfl:     Multiple Vitamins-Minerals (MULTI ADULT GUMMIES PO), Take by mouth, Disp: , Rfl:     rosuvastatin (CRESTOR) 5 MG tablet, TAKE 1 TABLET BY MOUTH EVERY DAY, Disp: 90 tablet, Rfl: 0    vitamin C (ASCORBIC ACID) 500 MG tablet, Take 1,000 mg by mouth daily, Disp: , Rfl:     sertraline (ZOLOFT) 100 MG tablet, TAKE 1 TABLET BY MOUTH TWICE A DAY, Disp: 180 tablet, Rfl: 0    acetaminophen (TYLENOL) 325 MG tablet, Take 650 mg by mouth every 6 hours as needed for Pain, Disp: , Rfl:     ibuprofen (ADVIL;MOTRIN) 200 MG tablet, Take 200 mg by mouth every 6 hours as needed for Pain, Disp: , Rfl:     valACYclovir (VALTREX) 1 g tablet, Take 1 tablet by mouth daily, Disp: 30 tablet, Rfl: 3  Allergies: Demerol and Simvastatin       SUBJECTIVE EXAMINATION     History obtained from[de-identified] Chart Review, Patient,      Family/Caregiver Present: No    Subjective History:   Patient states he is only comfortable when he lays down. Has been laying bed for 20 hrs a day for 2 weeks. Started 3 weeks ago, has bone spurs that are pressing on a nerve. Planning to do surgery. Will do a follow-up after therapy. Has a bluging disc. Soreness and tenderness along the groin. Numbness and tingling in the toes and down the LLE. Worse with standing, sitting. Retired.     Additional Pertinent Hx (if applicable):       Previous treatments prior to current episode?: Outpatient PT, Surgery  Any changes to allergies, medications, or have you had any medical procedures/ER visits since your last visit?: No       Learning/Language: Learning  Does the patient/guardian have any barriers to learning?: No barriers  Will there be a co-learner?: No  What is the preferred language of the patient/guardian?: English  Is an  required?: No  How does the patient/guardian prefer to learn new concepts?: Listening, Reading, Demonstration     Pain Screening    Pain Screening  Patient Currently in Pain: Yes  Pain Assessment: 0-10  Pain Level: 8  Best Pain Level: 5  Worst Pain Level: 9  Patient's Stated Pain Goal: 0 - No pain  Pain Type: Acute pain  Pain Location: Back, Hip  Pain Orientation: Left, Right  Pain Descriptors: Aching, Sharp, Sore, Shooting  Pain Frequency: Intermittent  Pain Onset: Awakened from sleep  Functional Pain Assessment: Prevents or interferes some active activities and ADLs  Aggravating factors: Standing, Walking, Sitting    Functional Status    Dominant Hand: : Right    Social History:    Social History  Lives With: Alone    Occupation/Interests:  Occupation: Retired    Prior Level of Function:  independent Independent     Current Level of Function:  independent      ADL Assistance: Independent  Homemaking Assistance: Independent  Ambulation Assistance: Independent  Transfer Assistance: Independent  Active : Yes  Mode of Transportation: Car    OBJECTIVE EXAMINATION   Restrictions:  NONE     Review of Systems:  Vision: Within Functional Limits  Hearing: Within functional limits  Follows Commands: Within Functional Limits    Observations:  General Observations  Description: patient ambulates without AD, noted moderate antalgic gait and slight forward trunk positioning    Mobility:   Transfers  Sit to Stand: Modified independent  Stand to Sit: Modified independent    ROM:   Left AROM  Right AROM         AROM LUE (degrees)  LUE AROM : WNL  AROM LLE (degrees)  LLE AROM : WNL    AROM RUE (degrees)  RUE AROM : WNL  AROM RLE (degrees)  RLE AROM: WNL     Left PROM  Right PROM       WNL     WNL        Left Strength  Right Strength      Strength Other  Other: H/L increased pain in R thigh, R hip; SKTC: increased lower back pain, no increased pain; DKTC increased low back symptoms; Other: H/L increased pain in R thigh, R hip; SKTC: increased lower back pain, no increased pain; DKTC increased low back symptoms;  Strength LLE  Strength LLE: WFL  Comment: except 4/5 hip abd/add Strength Other  Other: H/L increased pain in R thigh, R hip; SKTC: increased lower back pain, no increased pain; DKTC increased low back symptoms;   Other: H/L increased pain in R thigh, R hip; SKTC: increased lower back pain, no increased pain; DKTC increased low back symptoms;  Strength RLE  Strength RLE: WFL  Comment: except 4/5 hip add/abd     Additional Spinal Strength (if applicable): Other: H/L increased pain in R thigh, R hip; SKTC: increased lower back pain, no increased pain; DKTC increased low back symptoms; Additional Finding(s) (if applicable): Additional Measures  Special Tests: 6MWT: 743 ft  Other: Prone Extension: pain in lower back, mild; no symptoms in the leg. ASSESSMENT     Impression:Assessment: Pt is 65year old male with 2-3 week onset of low back pain without MIRTHA. Previously attended therapy for bilateral hip pain after a fall last year, but no previous spinal injuries. Had MRI completed that displayed an L2/3 disc protrusion, by according to the patient the doctor has noted bone spurring as well (unable to specify region). Pt now has difficulties standing/sitting and only finds relief with supine activities. Pt demo deficits this date that include poor PPT and stability, decreases in hip/gluteal strength, able to centralize symptoms with prone propping, poor lumbar spine joint mobilization. Pt will benefit with PT services with core stability, pain management, STM/manual, centralization techniques, progressive BLE strengthening to return to PLOF. Pt prior to onset of current condition had min/no pain with able to complete full ADLs and work activities. Will trial therapy for pain management and strength programing.     Body Structures, Functions, Activity Limitations Requiring Skilled Therapeutic Intervention: Decreased functional mobility , Decreased ADL status, Decreased endurance, Decreased sensation, Increased pain, Decreased ROM, Decreased balance, Decreased posture, Decreased strength    Statement of Medical Necessity: Physical Therapy is both indicated and medically necessary as outlined in the POC to increase the likelihood of meeting the functionally related goals stated below. Patient's Activity Tolerance: Patient tolerated evaluation without incident        Patient's rehabilitation potential/prognosis is considered to be: Good    Factors which may impact rehabilitation potential include: None  Measures taken to address barrier(s): N/A     GOALS     Patient Goal(s): improve pain  Short Term Goals Completed by 4 weeks Goal Status   Pt demo I w/ HEP and ability to centralize his symptoms New   Pt reports 25% improvement in symptoms to improve tolerance to ADL's New   Pt demo ability to complete 5 sit to stands without UE assist and pain reported <5/10 New   Pt demo ability to ambulate >800 ft in 6 minutes and pain reported <5/10 New                                         TREATMENT PLAN       Requires PT Follow-Up: Yes    Pt. actively involved in establishing Plan of Care and Goals: Yes  Patient/ Caregiver education and instruction:Goals, PT Role, Plan of Care, Evaluative findings, Pressure Relief, Home Exercise Program, Posture, Anatomy of condition, Disease Specific Education, Body mechanics             Treatment may include any combination of the following: Strengthening, ROM, Balance training, Pain management, Gait training, Stair training, Modalities, Neuromuscular re-education, Manual Therapy - Joint Manipulation, Manual Therapy - Soft Tissue Mobilization, Endurance training, Therapeutic activities, Home exercise program     Frequency / Duration:  Patient to be seen 2x for 4 weeks weeks       Eval Complexity:    Decision Making: Low Complexity     Therapist Signature: Jean Marie Gallardo PT    Date: 08/09/4321     I certify that the above Therapy Services are being furnished while the patient is under my care. I agree with the treatment plan and certify that this therapy is necessary.       Physician's Signature:  ___________________________   Date:_______                                                                   Donaldo Thompson MD TUCKER        Physician Comments: _______________________________________________    Please sign and return to   [facility]. Please fax to the location listed below.  Todd Leach for this referral!    [address], [address] [id number]  Dept: [phone]  Loc: [phone]

## 2022-10-13 NOTE — FLOWSHEET NOTE
Outpatient Physical Therapy  [place]           [x] Phone: [phone]   Fax: [phone]  [place]           [] Phone: [phone]   Fax: [phone]        Physical Therapy Daily Treatment Note  Date:  10/13/2022    Patient Name:  Alok Manuel    :  1960  MRN: [id number]  Restrictions/Precautions: NONE  Diagnosis:   Other intervertebral disc displacement, lumbar region [M51.26] Diagnosis: herniation  Date of Injury/Surgery: --  Treatment Diagnosis:  Decreased actviity tolerance limited by LBP  Insurance/Certification information: r  Referring Physician:  MD Dr. Vanessa Piña   PCP: Yue Lawrence DO  Next Doctor Visit:  --  Plan of care signed (Y/N):  N, sent 10/13/22  Outcome Measure: Oswestry: see folder  Visit# / total visits:   1/  Pain level: 0/10   Goals:     Patient goals: improve pain  Short term goals  Time Frame for Short term goals: 4 weeks  Pt demo I w/ HEP and ability to centralize his symptoms  Pt reports 25% improvement in symptoms to improve tolerance to ADL's  Pt demo ability to complete 5 sit to stands without UE assist and pain reported <5/10  Pt demo ability to ambulate >800 ft in 6 minutes and pain reported <5/10     Summary of Evaluation:   Pt is 65year old male with 2-3 week onset of low back pain without MIRTHA. Previously attended therapy for bilateral hip pain after a fall last year, but no previous spinal injuries. Had MRI completed that displayed an L2/3 disc protrusion, by according to the patient the doctor has noted bone spurring as well (unable to specify region). Pt now has difficulties standing/sitting and only finds relief with supine activities. Pt demo deficits this date that include poor PPT and stability, decreases in hip/gluteal strength, able to centralize symptoms with prone propping, poor lumbar spine joint mobilization.  Pt will benefit with PT services with core stability, pain management, STM/manual, centralization techniques, progressive BLE strengthening to return to PLOF. Pt prior to onset of current condition had min/no pain with able to complete full ADLs and work activities. Will trial therapy for pain management and strength programing. Subjective:  See reilly         Any changes in Ambulatory Summary Sheet? None        Objective:  See reilly   COVID screening questions were asked and patient attested that there had been no contact or symptoms        Exercises: (No more than 4 columns)   Exercise/Equipment 10/13/22 #1 Date Date           WARM UP         Nu Step            TABLE      *PPT      *Prone Prop      *Prone Lying w/ hip/arm ext                     STANDING                                                     PROPRIOCEPTION                                    MODALITIES                      Other Therapeutic Activities/Education:  Patient received education on their current pathology and how their condition effects them with their functional activities. Patient understood discussion and questions were answered. Patient understands their activity limitations and understands rational for treatment progression. Home Exercise Program:  HO issued, reviewed and discussed with patient. Pt agreed to comply. Manual Treatments:  --      Modalities:  --      Communication with other providers:  shahidal sent 10/13/22      Assessment:  (Response towards treatment session) (Pain Rating)     Pt is 65year old male with 2-3 week onset of low back pain without MIRTHA. Previously attended therapy for bilateral hip pain after a fall last year, but no previous spinal injuries. Had MRI completed that displayed an L2/3 disc protrusion, by according to the patient the doctor has noted bone spurring as well (unable to specify region). Pt now has difficulties standing/sitting and only finds relief with supine activities.  Pt demo deficits this date that include poor PPT and stability, decreases in hip/gluteal strength, able to centralize symptoms with prone propping, poor lumbar spine joint mobilization. Pt will benefit with PT services with core stability, pain management, STM/manual, centralization techniques, progressive BLE strengthening to return to PLOF. Pt prior to onset of current condition had min/no pain with able to complete full ADLs and work activities. Will trial therapy for pain management and strength programing.      Plan for Next Session: --       Time In / Time Out:   4394-7879       Timed Code/Total Treatment Minutes:  39'  (1) PT eval   (1) TE      Next Progress Note due:  10th visit      Plan of Care Interventions:  [x] Therapeutic Exercise  [] Modalities:  [x] Therapeutic Activity     [] Ultrasound  [] Estim  [x] Gait Training      [] Cervical Traction [] Lumbar Traction  [x] Neuromuscular Re-education    [] Cold/hotpack [] Iontophoresis   [x] Instruction in HEP      [] Vasopneumatic   [] Dry Needling    [x] Manual Therapy               [] Aquatic Therapy              Electronically signed by:  Arcelia Yeager, PT, 10/13/2022, 6:45 AM

## 2022-10-13 NOTE — TELEPHONE ENCOUNTER
Patient stopped by needing a refill for Vicodin but I do not see it on his medication list. He uses [facility] pharmacy on [address] [place] [address].

## 2022-10-14 RX ORDER — HYDROCODONE BITARTRATE AND ACETAMINOPHEN 5; 325 MG/1; MG/1
1 TABLET ORAL EVERY 6 HOURS PRN
Qty: 12 TABLET | Refills: 0 | Status: SHIPPED | OUTPATIENT
Start: 2022-10-14 | End: 2022-10-17

## 2022-10-14 NOTE — TELEPHONE ENCOUNTER
Spoke to patient. Discussed his symptoms. He was recently seen at the office and by Dr. Jose Angel Kwon. He anticipates surgery.  Norco sent in [place] ADR's explained

## 2022-10-14 NOTE — TELEPHONE ENCOUNTER
Patient called back in regards to Vicodin Rx. He states he has sharp pain in his back and would like it sent ASAP.

## 2022-10-17 ENCOUNTER — HOSPITAL ENCOUNTER (OUTPATIENT)
Dept: PHYSICAL THERAPY | Age: 62
Setting detail: THERAPIES SERIES
Discharge: HOME | End: 2022-10-17
Payer: COMMERCIAL

## 2022-10-17 PROCEDURE — 97110 THERAPEUTIC EXERCISES: CPT

## 2022-10-17 NOTE — FLOWSHEET NOTE
Outpatient Physical Therapy  [place]           [x] Phone: [phone]   Fax: [phone]  [place]           [] Phone: [phone]   Fax: [phone]        Physical Therapy Daily Treatment Note  Date:  10/17/2022    Patient Name:  Bc Rivera    :  1960  MRN: [id number]  Restrictions/Precautions: NONE  Diagnosis:   Other intervertebral disc displacement, lumbar region [M51.26] Diagnosis: herniation  Date of Injury/Surgery: --  Treatment Diagnosis:  Decreased actviity tolerance limited by LBP  Insurance/Certification information: Umr  Referring Physician:  MD Dr. Kelsey Richarsd   PCP: Karena Curling, DO  Next Doctor Visit:  --  Plan of care signed (Y/N):  N, sent 10/13/22  Outcome Measure: Oswestry: see folder  Visit# / total visits:   1/  Pain level: 2/10   Goals:     Patient goals: improve pain  Short term goals  Time Frame for Short term goals: 4 weeks  Pt demo I w/ HEP and ability to centralize his symptoms  Pt reports 25% improvement in symptoms to improve tolerance to ADL's  Pt demo ability to complete 5 sit to stands without UE assist and pain reported <5/10  Pt demo ability to ambulate >800 ft in 6 minutes and pain reported <5/10     Summary of Evaluation:   Pt is 65year old male with 2-3 week onset of low back pain without MIRTHA. Previously attended therapy for bilateral hip pain after a fall last year, but no previous spinal injuries. Had MRI completed that displayed an L2/3 disc protrusion, by according to the patient the doctor has noted bone spurring as well (unable to specify region). Pt now has difficulties standing/sitting and only finds relief with supine activities. Pt demo deficits this date that include poor PPT and stability, decreases in hip/gluteal strength, able to centralize symptoms with prone propping, poor lumbar spine joint mobilization.  Pt will benefit with PT services with core stability, pain management, STM/manual, centralization techniques, progressive BLE strengthening to return to PLOF. Pt prior to onset of current condition had min/no pain with able to complete full ADLs and work activities. Will trial therapy for pain management and strength programing. Subjective:   10/17/22: Autumn Chahal arrives 22' late today. Agreeable for table level core training today. Education on arriving at least 5' prior to appointment start time. He expresses 2/10 pain today in ant thigh and knee. Any changes in Ambulatory Summary Sheet? None        Objective:  See reilly   COVID screening questions were asked and patient attested that there had been no contact or symptoms    He demo's good TrA activation with cues. Compensates with oblique contraction. Exercises: (No more than 4 columns)   Exercise/Equipment 10/13/22 #1 10/17/22 #2 Date           WARM UP         Nu Step  Lvl 6, 5'          TABLE      TrA activation  5\" holds x 10    *PPT  2x10    *Prone Prop      *Prone Lying w/ hip/arm ext      Lumbar flexion static stretch in supine  3\" towel roll under PSIS    LTR   x20    SLR       TrA activation with LE raise  X10 ea side    Bridgining  Poor tolerance see below             STANDING                                                     PROPRIOCEPTION                                    MODALITIES                      Other Therapeutic Activities/Education:  Patient received education on their current pathology and how their condition effects them with their functional activities. Patient understood discussion and questions were answered. Patient understands their activity limitations and understands rational for treatment progression. Home Exercise Program:  HO issued, reviewed and discussed with patient. Pt agreed to comply. Manual Treatments:  --      Modalities:  --      Communication with other providers:  reilly sent 10/13/22      Assessment:  Autumn Chahal has good tolerance with today's activities.  He does not tolerate supine bridging and experiences HS cramping on the Lt, will attempt another time. He expresses ant thigh and knee discomfort/pain at rest. With Lumbar stretch he express mild relief. He ends today's session at 2/10 pain. Pt is 65year old male with 2-3 week onset of low back pain without MIRTHA. Previously attended therapy for bilateral hip pain after a fall last year, but no previous spinal injuries. Had MRI completed that displayed an L2/3 disc protrusion, by according to the patient the doctor has noted bone spurring as well (unable to specify region). Pt now has difficulties standing/sitting and only finds relief with supine activities. Pt demo deficits this date that include poor PPT and stability, decreases in hip/gluteal strength, able to centralize symptoms with prone propping, poor lumbar spine joint mobilization. Pt will benefit with PT services with core stability, pain management, STM/manual, centralization techniques, progressive BLE strengthening to return to PLOF. Pt prior to onset of current condition had min/no pain with able to complete full ADLs and work activities. Will trial therapy for pain management and strength programing. Plan for Next Session: --       Time In / Time Out:   5727-1848      Timed Code/Total Treatment Minutes:  23'  2 TE       Next Progress Note due:  10th visit      Plan of Care Interventions:  [x] Therapeutic Exercise  [] Modalities:  [x] Therapeutic Activity     [] Ultrasound  [] Estim  [x] Gait Training      [] Cervical Traction [] Lumbar Traction  [x] Neuromuscular Re-education    [] Cold/hotpack [] Iontophoresis   [x] Instruction in HEP      [] Vasopneumatic   [] Dry Needling    [x] Manual Therapy               [] Aquatic Therapy              Electronically signed by:   Natalio Sanchez  10/17/2022  10:49 AM

## 2022-10-20 ENCOUNTER — HOSPITAL ENCOUNTER (OUTPATIENT)
Dept: PHYSICAL THERAPY | Age: 62
Setting detail: THERAPIES SERIES
Discharge: HOME | End: 2022-10-20
Payer: COMMERCIAL

## 2022-10-20 PROCEDURE — 97110 THERAPEUTIC EXERCISES: CPT

## 2022-10-20 NOTE — FLOWSHEET NOTE
Outpatient Physical Therapy  [place]           [x] Phone: [phone]   Fax: [phone]  [place]           [] Phone: [phone]   Fax: [phone]        Physical Therapy Daily Treatment Note  Date:  10/20/2022    Patient Name:  Nav Kang    :  1960  MRN: [id number]  Restrictions/Precautions: NONE  Diagnosis:   Other intervertebral disc displacement, lumbar region [M51.26] Diagnosis: herniation  Date of Injury/Surgery: --  Treatment Diagnosis:  Decreased actviity tolerance limited by LBP  Insurance/Certification information: r  Referring Physician:  MD Dr. Ramona Dyer   PCP: Rajiv Riojas DO  Next Doctor Visit:  --  Plan of care signed (Y/N):  N, sent 10/13/22  Outcome Measure: Oswestry: see folder  Visit# / total visits:   1/  Pain level: 2/10   Goals:     Patient goals: improve pain  Short term goals  Time Frame for Short term goals: 4 weeks  Pt demo I w/ HEP and ability to centralize his symptoms reports compliance 10/20/22  Pt reports 25% improvement in symptoms to improve tolerance to ADL's  Pt demo ability to complete 5 sit to stands without UE assist and pain reported <5/10  Pt demo ability to ambulate >800 ft in 6 minutes and pain reported <5/10     Summary of Evaluation:   Pt is 65year old male with 2-3 week onset of low back pain without MIRTHA. Previously attended therapy for bilateral hip pain after a fall last year, but no previous spinal injuries. Had MRI completed that displayed an L2/3 disc protrusion, by according to the patient the doctor has noted bone spurring as well (unable to specify region). Pt now has difficulties standing/sitting and only finds relief with supine activities. Pt demo deficits this date that include poor PPT and stability, decreases in hip/gluteal strength, able to centralize symptoms with prone propping, poor lumbar spine joint mobilization.  Pt will benefit with PT services with core stability, pain management, STM/manual, centralization techniques, progressive BLE strengthening to return to PLOF. Pt prior to onset of current condition had min/no pain with able to complete full ADLs and work activities. Will trial therapy for pain management and strength programing. Subjective: John Lafleur states his pain is pretty under control today. Last night he was able to play pool without increased pain. He felt good following the last therapy session. He wants to get a second referral for his back. Any changes in Ambulatory Summary Sheet? None      Objective:  COVID screening questions were asked and patient attested that there had been no contact or symptoms    He demo's good TrA activation with cues. Compensates with oblique contraction. Had radicular symptoms with prone prop  Tends to have R sided groin pain    Exercises: (No more than 4 columns)   Exercise/Equipment 10/13/22 #1 10/17/22 #2 10/20/22 #3           WARM UP         Nu Step  Lvl 6, 5'    Recumbent    5'   TABLE      TrA activation  5\" holds x 10    *PPT  2x10 X20 5\"   *Prone Prop   30\", held due to radicular symptoms   *Prone Lying w/ hip/arm ext   Hold    Lumbar flexion static stretch in supine  3\" towel roll under PSIS    LTR   x20 x20   SLR    2x10 ea side   TrA activation with LE raise  X10 ea side    Bridgining  Poor tolerance see below             STANDING      Pallof Press   2x10 GTB   TB Row   2x10 Blue TB                                      PROPRIOCEPTION                                    MODALITIES                      Other Therapeutic Activities/Education:  Patient received education on their current pathology and how their condition effects them with their functional activities. Patient understood discussion and questions were answered. Patient understands their activity limitations and understands rational for treatment progression. Home Exercise Program:  HO issued, reviewed and discussed with patient. Pt agreed to comply.         Manual Treatments:  --      Modalities:  --      Communication with other providers:  reilly sent 10/13/22      Assessment:  Grabiel Lennon has good tolerance with today's activities. He has improved tolerance to activities during the session. Continued progression of core stability/TA activation. Did have increased radicular symptoms with prone activities, therefore held on these at this time. End of session: centralized         Pt is 65year old male with 2-3 week onset of low back pain without MIRTHA. Previously attended therapy for bilateral hip pain after a fall last year, but no previous spinal injuries. Had MRI completed that displayed an L2/3 disc protrusion, by according to the patient the doctor has noted bone spurring as well (unable to specify region). Pt now has difficulties standing/sitting and only finds relief with supine activities. Pt demo deficits this date that include poor PPT and stability, decreases in hip/gluteal strength, able to centralize symptoms with prone propping, poor lumbar spine joint mobilization. Pt will benefit with PT services with core stability, pain management, STM/manual, centralization techniques, progressive BLE strengthening to return to PLOF. Pt prior to onset of current condition had min/no pain with able to complete full ADLs and work activities. Will trial therapy for pain management and strength programing.      Plan for Next Session: --       Time In / Time Out:   6016-8905      Timed Code/Total Treatment Minutes:  29'  (2) TE      Next Progress Note due:  10th visit      Plan of Care Interventions:  [x] Therapeutic Exercise  [] Modalities:  [x] Therapeutic Activity     [] Ultrasound  [] Estim  [x] Gait Training      [] Cervical Traction [] Lumbar Traction  [x] Neuromuscular Re-education    [] Cold/hotpack [] Iontophoresis   [x] Instruction in HEP      [] Vasopneumatic   [] Dry Needling    [x] Manual Therapy               [] Aquatic Therapy              Electronically signed by: Marlen Gonzales [place]  10/20/2022  6:45 AM

## 2022-10-22 DIAGNOSIS — F33.0 MILD EPISODE OF RECURRENT MAJOR DEPRESSIVE DISORDER (HCC): ICD-10-CM

## 2022-10-24 RX ORDER — SERTRALINE HYDROCHLORIDE 100 MG/1
TABLET, FILM COATED ORAL
Qty: 180 TABLET | Refills: 0 | Status: SHIPPED | OUTPATIENT
Start: 2022-10-24 | End: 2022-11-30 | Stop reason: SDUPTHER

## 2022-10-27 ENCOUNTER — HOSPITAL ENCOUNTER (OUTPATIENT)
Dept: PHYSICAL THERAPY | Age: 62
Setting detail: THERAPIES SERIES
Discharge: HOME | End: 2022-10-27
Payer: COMMERCIAL

## 2022-10-27 PROCEDURE — 97110 THERAPEUTIC EXERCISES: CPT

## 2022-10-27 PROCEDURE — 97530 THERAPEUTIC ACTIVITIES: CPT

## 2022-10-27 NOTE — FLOWSHEET NOTE
Outpatient Physical Therapy  [place]           [x] Phone: [phone]   Fax: [phone]  [place]           [] Phone: [phone]   Fax: [phone]        Physical Therapy Daily Treatment Note  Date:  10/27/2022    Patient Name:  Brady Anton    :  1960  MRN: [id number]  Restrictions/Precautions: NONE  Diagnosis:   Other intervertebral disc displacement, lumbar region [M51.26] Diagnosis: herniation  Date of Injury/Surgery: --  Treatment Diagnosis:  Decreased actviity tolerance limited by LBP  Insurance/Certification information: r  Referring Physician:  MD Dr. Tarah Reynolds   PCP: Akira Davis DO  Next Doctor Visit:  --  Plan of care signed (Y/N):  y, signed 10/18/22  Outcome Measure: Oswestry: see folder  Visit# / total visits:      Pain level: 2/10   Goals:     Patient goals: improve pain  Short term goals  Time Frame for Short term goals: 4 weeks  Pt demo I w/ HEP and ability to centralize his symptoms reports compliance 10/20/22  Pt reports 25% improvement in symptoms to improve tolerance to ADL's  Pt demo ability to complete 5 sit to stands without UE assist and pain reported <5/10  Pt demo ability to ambulate >800 ft in 6 minutes and pain reported <5/10     Summary of Evaluation:   Pt is 65year old male with 2-3 week onset of low back pain without MIRTHA. Previously attended therapy for bilateral hip pain after a fall last year, but no previous spinal injuries. Had MRI completed that displayed an L2/3 disc protrusion, by according to the patient the doctor has noted bone spurring as well (unable to specify region). Pt now has difficulties standing/sitting and only finds relief with supine activities. Pt demo deficits this date that include poor PPT and stability, decreases in hip/gluteal strength, able to centralize symptoms with prone propping, poor lumbar spine joint mobilization.  Pt will benefit with PT services with core stability, pain management, STM/manual, centralization techniques, progressive BLE strengthening to return to PLOF. Pt prior to onset of current condition had min/no pain with able to complete full ADLs and work activities. Will trial therapy for pain management and strength programing. Subjective: Devon Catalan checked in after arrival. Arrives at [phone]. He arrives with no pain. Any changes in Ambulatory Summary Sheet? None      Objective:  COVID screening questions were asked and patient attested that there had been no contact or symptoms    He demo's good TrA activation with cues. Compensates with oblique contraction. Has no radicular symptoms  Tends to have R posterior hip pain with dead bug  Paraspinal tightness with standing activities, foam roll to relax back mm    Exercises: (No more than 4 columns)   Exercise/Equipment 10/13/22 #1 10/17/22 #2 10/20/22 #3 10/27/22 #4            WARM UP          Nu Step  Lvl 6, 5'  Lvl 6,5' no UE   Recumbent    5'    TABLE       TrA activation  5\" holds x 10     *PPT  2x10 X20 5\" X20 5\"   *Prone Prop   30\", held due to radicular symptoms On elbows 1'   *Prone Lying w/ hip/arm ext   Hold  1'    Lumbar flexion static stretch in supine  3\" towel roll under PSIS     LTR   x20 x20 x20   SLR    2x10 ea side 2x10 ea side   TrA activation with LE raise  X10 ea side     Bridgining  Poor tolerance see below  With PPT 2x10   Dead bugs    Alt LE today 2x10   Crunches    2x10 with BUE reaching              STANDING       Pallof Press   2x10 GTB 2x10 GTB   TB Row   2x10 Blue TB 2x10 GTB          Foam roll squats    2x10          Prone    1'               PROPRIOCEPTION                                          MODALITIES                         Other Therapeutic Activities/Education:  Patient received education on their current pathology and how their condition effects them with their functional activities. Patient understood discussion and questions were answered.  Patient understands their activity limitations and understands rational for treatment progression. Home Exercise Program:  HO issued, reviewed and discussed with patient. Pt agreed to comply. Manual Treatments:  --      Modalities:  --      Communication with other providers:  shahidal sent 10/13/22      Assessment:  Alesia Auguste has good tolerance with today's activities. Patient experiences mild increase in Rt hip/groin pain with dead bug activity. Standing thera band exercise increases paraspinal tightness. Foam rolls relieve tightness and patient can continue theraband activities. Continued progression of core stability/TA activation. No radicular symptoms with prone activities. End of session: centralized         Pt is 65year old male with 2-3 week onset of low back pain without MIRTHA. Previously attended therapy for bilateral hip pain after a fall last year, but no previous spinal injuries. Had MRI completed that displayed an L2/3 disc protrusion, by according to the patient the doctor has noted bone spurring as well (unable to specify region). Pt now has difficulties standing/sitting and only finds relief with supine activities. Pt demo deficits this date that include poor PPT and stability, decreases in hip/gluteal strength, able to centralize symptoms with prone propping, poor lumbar spine joint mobilization. Pt will benefit with PT services with core stability, pain management, STM/manual, centralization techniques, progressive BLE strengthening to return to PLOF. Pt prior to onset of current condition had min/no pain with able to complete full ADLs and work activities. Will trial therapy for pain management and strength programing.      Plan for Next Session: --       Time In / Time Out:   5990-8040      Timed Code/Total Treatment Minutes:  43'  (2) TE (1) TA      Next Progress Note due:  10th visit      Plan of Care Interventions:  [x] Therapeutic Exercise  [] Modalities:  [x] Therapeutic Activity     [] Ultrasound  [] Estim  [x] Gait Training [] Cervical Traction [] Lumbar Traction  [x] Neuromuscular Re-education    [] Cold/hotpack [] Iontophoresis   [x] Instruction in HEP      [] Vasopneumatic   [] Dry Needling    [x] Manual Therapy               [] Aquatic Therapy              Electronically signed by:   Natalio Menard  10/27/2022  1:52 PM

## 2022-11-30 ENCOUNTER — OFFICE VISIT (OUTPATIENT)
Dept: INTERNAL MEDICINE CLINIC | Age: 62
End: 2022-11-30
Payer: COMMERCIAL

## 2022-11-30 VITALS
HEART RATE: 72 BPM | SYSTOLIC BLOOD PRESSURE: 118 MMHG | BODY MASS INDEX: 30.18 KG/M2 | OXYGEN SATURATION: 96 % | DIASTOLIC BLOOD PRESSURE: 70 MMHG | HEIGHT: 71 IN | WEIGHT: 215.6 LBS

## 2022-11-30 DIAGNOSIS — M25.50 ARTHRALGIA, UNSPECIFIED JOINT: ICD-10-CM

## 2022-11-30 DIAGNOSIS — F51.01 PRIMARY INSOMNIA: Primary | ICD-10-CM

## 2022-11-30 DIAGNOSIS — H93.13 TINNITUS OF BOTH EARS: ICD-10-CM

## 2022-11-30 DIAGNOSIS — E78.5 HYPERLIPIDEMIA, UNSPECIFIED HYPERLIPIDEMIA TYPE: ICD-10-CM

## 2022-11-30 DIAGNOSIS — F33.0 MILD EPISODE OF RECURRENT MAJOR DEPRESSIVE DISORDER (HCC): ICD-10-CM

## 2022-11-30 DIAGNOSIS — R42 VERTIGO: ICD-10-CM

## 2022-11-30 PROCEDURE — 99214 OFFICE O/P EST MOD 30 MIN: CPT | Performed by: FAMILY MEDICINE

## 2022-11-30 RX ORDER — SERTRALINE HYDROCHLORIDE 100 MG/1
TABLET, FILM COATED ORAL
Qty: 270 TABLET | Refills: 1 | Status: SHIPPED | OUTPATIENT
Start: 2022-11-30

## 2022-11-30 RX ORDER — ROSUVASTATIN CALCIUM 5 MG/1
TABLET, COATED ORAL
Qty: 90 TABLET | Refills: 1 | Status: SHIPPED | OUTPATIENT
Start: 2022-11-30

## 2022-11-30 RX ORDER — MECLIZINE HYDROCHLORIDE 25 MG/1
25 TABLET ORAL 3 TIMES DAILY PRN
Qty: 30 TABLET | Refills: 0 | Status: SHIPPED | OUTPATIENT
Start: 2022-11-30 | End: 2022-12-10

## 2022-11-30 ASSESSMENT — ENCOUNTER SYMPTOMS
SHORTNESS OF BREATH: 0
COUGH: 0
NAUSEA: 0
ABDOMINAL PAIN: 0

## 2022-11-30 NOTE — PROGRESS NOTES
Nav Kang (:  1960) is a 65 y.o. male,established patient, here for evaluation of the following chief complaint(s):  Follow-up, Insomnia, and Hyperlipidemia         ASSESSMENT/PLAN:  1. Primary insomnia  On Ambien 5 mg prn    2. Mild episode of recurrent major depressive disorder (HCC)    -Increase Zoloft to 300 mg total  - sertraline (ZOLOFT) 100 MG tablet; Take 3 tablets by mouth  daily  Dispense: 270 tablet; Refill: 1  ADR's explained. Patient understands    3. Hyperlipidemia, unspecified hyperlipidemia type  - rosuvastatin (CRESTOR) 5 MG tablet; TAKE 1 TABLET BY MOUTH EVERY DAY  Dispense: 90 tablet; Refill: 1  Patient to hold the medication for a few days to see if this is the cause of his symptoms    4. Tinnitus of both ears  Declines further evaluation    5. Vertigo  - meclizine (ANTIVERT) 25 MG tablet; Take 1 tablet by mouth 3 times daily as needed for Dizziness  Dispense: 30 tablet; Refill: 0    6. Arthralgia, unspecified joint  -Sedimentation Rate; Future  - Rheumatoid Factor; Future  - HANG Reflex to Antibody Cascade; Future  - CYCLIC CITRUL PEPTIDE ANTIBODY, IGG; Future  - C-Reactive Protein; Future  - TSH with Reflex; Future  - CK; Future  Persist RTO or call  Return in about 3 months (around 2023) for HLD, insomnia.        Lab Results   Component Value Date    WBC 10.2 2022    HGB 14.2 2022    HCT 41.5 2022    MCV 90.5 2022     2022     Lab Results   Component Value Date    CHOL 212 (H) 2022     Lab Results   Component Value Date    TRIG 179 (H) 2022     Lab Results   Component Value Date    HDL 29 (L) 2022     Lab Results   Component Value Date    LDLCALC 147 (H) 2022     Lab Results   Component Value Date    LABA1C 5.6 2022     Lab Results   Component Value Date    .0 2022     Lab Results   Component Value Date     2022     2022    K 4.1 2022    K 4.1 2022     05/07/2022     05/07/2022    CO2 22 05/07/2022    CO2 22 05/07/2022    BUN 15 05/07/2022    BUN 17 05/07/2022    CREATININE 1.1 05/07/2022    CREATININE 1.1 05/07/2022    GLUCOSE 108 05/07/2022    GLUCOSE 108 05/07/2022    CALCIUM 9.3 05/07/2022    CALCIUM 9.3 05/07/2022    PROT 6.4 08/11/2022    LABALBU 4.2 08/11/2022    BILITOT 0.4 08/11/2022    ALKPHOS 69 08/11/2022    AST 17 08/11/2022    ALT 17 08/11/2022    LABGLOM 76 05/07/2022    GFRAA >60 05/14/2021     Lab Results   Component Value Date    TSH 3.4 05/07/2022       Subjective   SUBJECTIVE/OBJECTIVE:    HISTORY OF PRESENT ILLNESS:  This is a 65 y.o. male here for the following:  Patient Active Problem List    Diagnosis Date Noted    Localized osteoarthritis of shoulder regions, bilateral 06/28/2022    IFG (impaired fasting glucose) 05/09/2022    Gastroesophageal reflux disease without esophagitis 02/08/2022    Glaucoma     Mild episode of recurrent major depressive disorder ([facility] [address]     Hyperlipidemia 12/07/2020    Herpes labialis     Primary insomnia 08/04/2020      Tinnitus B/L- he declines imaging or ENT evaluation at this time  Insomnia- stable on Ambien 5 mg  HLD- on Crestor 5  Depression- on Zoloft 200. He wants to go up to 300 as he has been using this higher dose and he feels better  Vertigo - No current symptoms, but he would like a refill on the meclizine  +Arthralgia- he can hurt in multiple joints. Hx of OA. He can have some muscle aches as well. (He is on Crestor)      Review of Systems   Constitutional:  Negative for diaphoresis and fever. HENT:  Positive for tinnitus (bilateral). Respiratory:  Negative for cough and shortness of breath. Cardiovascular:  Negative for chest pain and palpitations. Gastrointestinal:  Negative for abdominal pain and nausea. Genitourinary:  Negative for difficulty urinating. Musculoskeletal:  Positive for arthralgias. Neurological:  Negative for dizziness and headaches.    Psychiatric/Behavioral: Negative for dysphoric mood. Allergies   Allergen Reactions    Demerol     Simvastatin      Cramps     Current Outpatient Medications   Medication Sig Dispense Refill    sertraline (ZOLOFT) 100 MG tablet Take 3 tablets by mouth  daily 270 tablet 1    rosuvastatin (CRESTOR) 5 MG tablet TAKE 1 TABLET BY MOUTH EVERY DAY 90 tablet 1    meclizine (ANTIVERT) 25 MG tablet Take 1 tablet by mouth 3 times daily as needed for Dizziness 30 tablet 0    ondansetron (ZOFRAN) 4 MG tablet Take 1 tablet by mouth 3 times daily as needed for Nausea or Vomiting 15 tablet 0    Zinc Sulfate (ZINC 15 PO) Take by mouth Unsure of dosage      SELENIUM PO Take by mouth      Omega-3 Fatty Acids (FISH OIL) 1000 MG CAPS Take 3,000 mg by mouth 3 times daily      Multiple Vitamins-Minerals (MULTI ADULT GUMMIES PO) Take by mouth      vitamin C (ASCORBIC ACID) 500 MG tablet Take 1,000 mg by mouth daily      acetaminophen (TYLENOL) 325 MG tablet Take 650 mg by mouth every 6 hours as needed for Pain      ibuprofen (ADVIL;MOTRIN) 200 MG tablet Take 200 mg by mouth every 6 hours as needed for Pain      valACYclovir (VALTREX) 1 g tablet Take 1 tablet by mouth daily (Patient not taking: Reported on 11/30/2022) 30 tablet 3     No current facility-administered medications for this visit. Vitals:    11/30/22 1336   BP: 118/70   Site: Left Upper Arm   Position: Sitting   Cuff Size: Medium Adult   Pulse: 72   SpO2: 96%   Weight: 215 lb 9.6 oz (97.8 kg)   Height: 5' 11\" (1.803 m)     Objective   Physical Exam  Vitals reviewed. Constitutional:       General: He is not in acute distress. Eyes:      Extraocular Movements: Extraocular movements intact. Cardiovascular:      Rate and Rhythm: Normal rate and regular rhythm. Pulmonary:      Effort: Pulmonary effort is normal. No respiratory distress. Breath sounds: Normal breath sounds. Abdominal:      Palpations: Abdomen is soft. Tenderness: There is no abdominal tenderness. Musculoskeletal:      Cervical back: Neck supple. Right lower leg: No edema. Left lower leg: No edema. Neurological:      Mental Status: He is alert and oriented to person, place, and time. Psychiatric:         Mood and Affect: Mood normal.              An electronic signature was used to authenticate this note. --Drewgeorges Solitario DO     This dictation was generated by voice recognition computer software. Although all attempts are made to edit the dictation for accuracy, there may be errors in the transcription that are not intended.

## 2023-01-03 LAB
C-REACTIVE PROTEIN: 0.3
RHEUMATOID FACTOR: 10
SEDIMENTATION RATE, ERYTHROCYTE: 10
TOTAL CK: 87 U/L
TSH SERPL DL<=0.05 MIU/L-ACNC: 2.32 UIU/ML

## 2023-01-04 DIAGNOSIS — F51.01 PRIMARY INSOMNIA: ICD-10-CM

## 2023-01-04 RX ORDER — ZOLPIDEM TARTRATE 5 MG/1
5 TABLET ORAL NIGHTLY PRN
Qty: 30 TABLET | Refills: 1 | Status: SHIPPED | OUTPATIENT
Start: 2023-01-04 | End: 2023-02-03

## 2023-01-06 DIAGNOSIS — M25.50 ARTHRALGIA, UNSPECIFIED JOINT: ICD-10-CM

## 2023-03-02 ENCOUNTER — OFFICE VISIT (OUTPATIENT)
Dept: INTERNAL MEDICINE CLINIC | Age: 63
End: 2023-03-02

## 2023-03-02 VITALS
HEIGHT: 71 IN | HEART RATE: 69 BPM | BODY MASS INDEX: 29.68 KG/M2 | WEIGHT: 212 LBS | DIASTOLIC BLOOD PRESSURE: 78 MMHG | OXYGEN SATURATION: 96 % | SYSTOLIC BLOOD PRESSURE: 120 MMHG

## 2023-03-02 DIAGNOSIS — E78.5 HYPERLIPIDEMIA, UNSPECIFIED HYPERLIPIDEMIA TYPE: ICD-10-CM

## 2023-03-02 DIAGNOSIS — T46.6X5A ADVERSE REACTION TO STATIN MEDICATION: ICD-10-CM

## 2023-03-02 DIAGNOSIS — F51.01 PRIMARY INSOMNIA: Primary | ICD-10-CM

## 2023-03-02 DIAGNOSIS — G89.29 CHRONIC RIGHT-SIDED LOW BACK PAIN WITH RIGHT-SIDED SCIATICA: ICD-10-CM

## 2023-03-02 DIAGNOSIS — M54.41 CHRONIC RIGHT-SIDED LOW BACK PAIN WITH RIGHT-SIDED SCIATICA: ICD-10-CM

## 2023-03-02 DIAGNOSIS — F33.0 MILD EPISODE OF RECURRENT MAJOR DEPRESSIVE DISORDER (HCC): ICD-10-CM

## 2023-03-02 RX ORDER — ZOLPIDEM TARTRATE 5 MG/1
5 TABLET ORAL NIGHTLY PRN
Qty: 30 TABLET | Refills: 2 | Status: SHIPPED | OUTPATIENT
Start: 2023-03-02 | End: 2023-04-01

## 2023-03-02 RX ORDER — SERTRALINE HYDROCHLORIDE 100 MG/1
TABLET, FILM COATED ORAL
Qty: 270 TABLET | Refills: 1 | Status: SHIPPED | OUTPATIENT
Start: 2023-03-02

## 2023-03-02 SDOH — ECONOMIC STABILITY: FOOD INSECURITY: WITHIN THE PAST 12 MONTHS, THE FOOD YOU BOUGHT JUST DIDN'T LAST AND YOU DIDN'T HAVE MONEY TO GET MORE.: NEVER TRUE

## 2023-03-02 SDOH — ECONOMIC STABILITY: FOOD INSECURITY: WITHIN THE PAST 12 MONTHS, YOU WORRIED THAT YOUR FOOD WOULD RUN OUT BEFORE YOU GOT MONEY TO BUY MORE.: NEVER TRUE

## 2023-03-02 SDOH — ECONOMIC STABILITY: HOUSING INSECURITY
IN THE LAST 12 MONTHS, WAS THERE A TIME WHEN YOU DID NOT HAVE A STEADY PLACE TO SLEEP OR SLEPT IN A SHELTER (INCLUDING NOW)?: NO

## 2023-03-02 SDOH — ECONOMIC STABILITY: INCOME INSECURITY: HOW HARD IS IT FOR YOU TO PAY FOR THE VERY BASICS LIKE FOOD, HOUSING, MEDICAL CARE, AND HEATING?: NOT HARD AT ALL

## 2023-03-02 ASSESSMENT — ENCOUNTER SYMPTOMS
SHORTNESS OF BREATH: 0
NAUSEA: 0
ABDOMINAL PAIN: 0
COUGH: 0

## 2023-03-02 ASSESSMENT — PATIENT HEALTH QUESTIONNAIRE - PHQ9
8. MOVING OR SPEAKING SO SLOWLY THAT OTHER PEOPLE COULD HAVE NOTICED. OR THE OPPOSITE, BEING SO FIGETY OR RESTLESS THAT YOU HAVE BEEN MOVING AROUND A LOT MORE THAN USUAL: 1
5. POOR APPETITE OR OVEREATING: 0
10. IF YOU CHECKED OFF ANY PROBLEMS, HOW DIFFICULT HAVE THESE PROBLEMS MADE IT FOR YOU TO DO YOUR WORK, TAKE CARE OF THINGS AT HOME, OR GET ALONG WITH OTHER PEOPLE: 0
SUM OF ALL RESPONSES TO PHQ QUESTIONS 1-9: 7
6. FEELING BAD ABOUT YOURSELF - OR THAT YOU ARE A FAILURE OR HAVE LET YOURSELF OR YOUR FAMILY DOWN: 0
9. THOUGHTS THAT YOU WOULD BE BETTER OFF DEAD, OR OF HURTING YOURSELF: 0
7. TROUBLE CONCENTRATING ON THINGS, SUCH AS READING THE NEWSPAPER OR WATCHING TELEVISION: 0
1. LITTLE INTEREST OR PLEASURE IN DOING THINGS: 0
2. FEELING DOWN, DEPRESSED OR HOPELESS: 0
3. TROUBLE FALLING OR STAYING ASLEEP: 3
SUM OF ALL RESPONSES TO PHQ QUESTIONS 1-9: 7
SUM OF ALL RESPONSES TO PHQ9 QUESTIONS 1 & 2: 0
SUM OF ALL RESPONSES TO PHQ QUESTIONS 1-9: 7
SUM OF ALL RESPONSES TO PHQ QUESTIONS 1-9: 7
4. FEELING TIRED OR HAVING LITTLE ENERGY: 3

## 2023-03-02 NOTE — PROGRESS NOTES
Panfilo Haines (:  1960) is a 65 y.o. male,established patient, here for evaluation of the following chief complaint(s):  Follow-up, Depression, and Hyperlipidemia         ASSESSMENT/PLAN:  1. Primary insomnia  - zolpidem (AMBIEN) 5 MG tablet; Take 1 tablet by mouth nightly as needed for Sleep for up to 30 days. Max Daily Amount: 5 mg  Dispense: 30 tablet; Refill: 2  Oarrs checked  ADR's explained    2. Mild episode of recurrent major depressive disorder (HCC)  - sertraline (ZOLOFT) 100 MG tablet; Take 3 tablets by mouth  daily  Dispense: 270 tablet; Refill: 1    3. Hyperlipidemia, unspecified hyperlipidemia type  Pt stopped Crestor and he does not want to use medications    4. Chronic right-sided low back pain with right-sided sciatica  Keep f/u with specialists    5. Adverse reaction to statin medication    Return in about 3 months (around 2023) for Depression, insomnia.        Lab Results   Component Value Date    WBC 10.2 2022    HGB 14.2 2022    HCT 41.5 2022    MCV 90.5 2022     2022     Lab Results   Component Value Date    CHOL 212 (H) 2022     Lab Results   Component Value Date    TRIG 179 (H) 2022     Lab Results   Component Value Date    HDL 29 (L) 2022     Lab Results   Component Value Date    LDLCALC 147 (H) 2022       Lab Results   Component Value Date     2022     2022    K 4.1 2022    K 4.1 2022     2022     2022    CO2 22 2022    CO2 22 2022    BUN 15 2022    BUN 17 2022    CREATININE 1.1 2022    CREATININE 1.1 2022    GLUCOSE 108 2022    GLUCOSE 108 2022    CALCIUM 9.3 2022    CALCIUM 9.3 2022    PROT 6.4 2022    LABALBU 4.2 2022    BILITOT 0.4 2022    ALKPHOS 69 2022    AST 17 2022    ALT 17 2022    LABGLOM 76 2022    GFRAA >60 2021     Lab Results Component Value Date    TSH 2.320 01/03/2023         Subjective   SUBJECTIVE/OBJECTIVE:    HISTORY OF PRESENT ILLNESS:  This is a 65 y.o. male here for the following:  Patient Active Problem List    Diagnosis Date Noted    Localized osteoarthritis of shoulder regions, bilateral 06/28/2022    IFG (impaired fasting glucose) 05/09/2022    Gastroesophageal reflux disease without esophagitis 02/08/2022    Glaucoma     Mild episode of recurrent major depressive disorder ([facility] [address]     Hyperlipidemia 12/07/2020    Herpes labialis     Primary insomnia 08/04/2020      Insomnia- on  Ambien  5 mg. No problems on the medication  HLD- off of Crestor. Adverse reaction to statins  Depression- on Zoloft and stable  Chronic LBP with R sided sciatica. +Spinal stenosis and HNP-lumbar. He has completed P.T. He follows with Dr. Sam Tong- neurosurgeon    Review of Systems   Constitutional:  Negative for diaphoresis and fever. Respiratory:  Negative for cough and shortness of breath. Cardiovascular:  Negative for chest pain and palpitations. Gastrointestinal:  Negative for abdominal pain and nausea. Genitourinary:  Negative for difficulty urinating. Musculoskeletal:  Positive for back pain. Neurological:  Negative for dizziness and headaches. Allergies   Allergen Reactions    Demerol     Simvastatin      Cramps     Current Outpatient Medications   Medication Sig Dispense Refill    zolpidem (AMBIEN) 5 MG tablet Take 1 tablet by mouth nightly as needed for Sleep for up to 30 days.  Max Daily Amount: 5 mg 30 tablet 2    sertraline (ZOLOFT) 100 MG tablet Take 3 tablets by mouth  daily 270 tablet 1    Zinc Sulfate (ZINC 15 PO) Take by mouth Unsure of dosage      SELENIUM PO Take by mouth      Omega-3 Fatty Acids (FISH OIL) 1000 MG CAPS Take 3,000 mg by mouth 3 times daily      Multiple Vitamins-Minerals (MULTI ADULT GUMMIES PO) Take by mouth      vitamin C (ASCORBIC ACID) 500 MG tablet Take 1,000 mg by mouth daily      acetaminophen (TYLENOL) 325 MG tablet Take 650 mg by mouth every 6 hours as needed for Pain      ibuprofen (ADVIL;MOTRIN) 200 MG tablet Take 200 mg by mouth every 6 hours as needed for Pain       No current facility-administered medications for this visit. Vitals:    03/02/23 1335   BP: 120/78   Site: Left Upper Arm   Position: Sitting   Cuff Size: Medium Adult   Pulse: 69   SpO2: 96%   Weight: 212 lb (96.2 kg)   Height: 5' 11\" (1.803 m)     Objective   Physical Exam  Vitals reviewed. Constitutional:       General: He is not in acute distress. Cardiovascular:      Rate and Rhythm: Normal rate and regular rhythm. Pulmonary:      Effort: Pulmonary effort is normal. No respiratory distress. Breath sounds: Normal breath sounds. Abdominal:      Palpations: Abdomen is soft. Tenderness: There is no abdominal tenderness. Musculoskeletal:      Cervical back: Neck supple. Right lower leg: No edema. Left lower leg: No edema. Neurological:      Mental Status: He is alert and oriented to person, place, and time. Psychiatric:         Mood and Affect: Mood normal.              An electronic signature was used to authenticate this note. --Severa Som, DO     This dictation was generated by voice recognition computer software. Although all attempts are made to edit the dictation for accuracy, there may be errors in the transcription that are not intended.

## 2023-03-12 ASSESSMENT — ENCOUNTER SYMPTOMS: BACK PAIN: 1

## 2023-07-13 ENCOUNTER — OFFICE VISIT (OUTPATIENT)
Dept: INTERNAL MEDICINE CLINIC | Age: 63
End: 2023-07-13
Payer: COMMERCIAL

## 2023-07-13 VITALS
DIASTOLIC BLOOD PRESSURE: 76 MMHG | HEART RATE: 60 BPM | OXYGEN SATURATION: 97 % | BODY MASS INDEX: 29.4 KG/M2 | HEIGHT: 71 IN | WEIGHT: 210 LBS | SYSTOLIC BLOOD PRESSURE: 122 MMHG

## 2023-07-13 DIAGNOSIS — F33.0 MILD EPISODE OF RECURRENT MAJOR DEPRESSIVE DISORDER (HCC): ICD-10-CM

## 2023-07-13 DIAGNOSIS — E78.5 HYPERLIPIDEMIA, UNSPECIFIED HYPERLIPIDEMIA TYPE: Primary | ICD-10-CM

## 2023-07-13 DIAGNOSIS — Z79.899 LONG TERM USE OF DRUG: ICD-10-CM

## 2023-07-13 DIAGNOSIS — Z13.1 SCREENING FOR DIABETES MELLITUS: ICD-10-CM

## 2023-07-13 DIAGNOSIS — F51.01 PRIMARY INSOMNIA: ICD-10-CM

## 2023-07-13 DIAGNOSIS — Z12.5 SCREENING FOR PROSTATE CANCER: ICD-10-CM

## 2023-07-13 PROCEDURE — 99213 OFFICE O/P EST LOW 20 MIN: CPT | Performed by: FAMILY MEDICINE

## 2023-07-13 RX ORDER — SERTRALINE HYDROCHLORIDE 100 MG/1
TABLET, FILM COATED ORAL
Qty: 270 TABLET | Refills: 1 | Status: SHIPPED | OUTPATIENT
Start: 2023-07-13

## 2023-07-13 RX ORDER — ZOLPIDEM TARTRATE 5 MG/1
TABLET ORAL
Qty: 30 TABLET | Refills: 2 | Status: SHIPPED | OUTPATIENT
Start: 2023-07-13 | End: 2023-08-13

## 2023-07-13 RX ORDER — ZOLPIDEM TARTRATE 5 MG/1
TABLET ORAL
COMMUNITY
Start: 2023-06-21 | End: 2023-07-13 | Stop reason: SDUPTHER

## 2023-07-13 ASSESSMENT — ENCOUNTER SYMPTOMS
NAUSEA: 0
COUGH: 0
ABDOMINAL PAIN: 0
BACK PAIN: 0
SHORTNESS OF BREATH: 0

## 2023-07-13 NOTE — PROGRESS NOTES
Lisha Mayfield (:  1960) is a 59 y.o. male,established patient, here for evaluation of the following chief complaint(s):  Follow-up, Insomnia, and Depression         ASSESSMENT/PLAN:  1. Hyperlipidemia, unspecified hyperlipidemia type  The patient is asked to make an attempt to improve diet and exercise patterns   He declines statins  - Lipid Panel; Future    2. Primary insomnia  - zolpidem (AMBIEN) 5 MG tablet; TAKE 1 TABLET BY MOUTH NIGHTLY AS NEEDED FOR SLEEP FOR UP TO 30 DAYS. MAX DAILY AMOUNT: 5 MG. Dispense: 30 tablet; Refill: 2  Oarrs checked  ADR's explained    3. Mild episode of recurrent major depressive disorder (HCC)  - sertraline (ZOLOFT) 100 MG tablet; Take 3 tablets by mouth  daily  Dispense: 270 tablet; Refill: 1    4. Screening for prostate cancer  - PSA, Prostatic Specific Antigen; Future    5. Long term use of drug  - CBC with Auto Differential; Future  - Comprehensive Metabolic Panel; Future    6.  Screening for diabetes mellitus  - Hemoglobin A1C; Future    Return in about 3 months (around 10/13/2023) for annual exam.       Lab Results   Component Value Date    WBC 10.2 2022    HGB 14.2 2022    HCT 41.5 2022    MCV 90.5 2022     2022     Lab Results   Component Value Date    CHOL 212 (H) 2022     Lab Results   Component Value Date    TRIG 179 (H) 2022     Lab Results   Component Value Date    HDL 29 (L) 2022     Lab Results   Component Value Date    LDLCALC 147 (H) 2022     Lab Results   Component Value Date    LABA1C 5.6 2022       Lab Results   Component Value Date     2022     2022    K 4.1 2022    K 4.1 2022     2022     2022    CO2 22 2022    CO2 22 2022    BUN 15 2022    BUN 17 2022    CREATININE 1.1 2022    CREATININE 1.1 2022    GLUCOSE 108 2022    GLUCOSE 108 2022    CALCIUM 9.3 2022    CALCIUM

## 2023-10-27 ENCOUNTER — OFFICE VISIT (OUTPATIENT)
Dept: INTERNAL MEDICINE CLINIC | Age: 63
End: 2023-10-27
Payer: COMMERCIAL

## 2023-10-27 VITALS
DIASTOLIC BLOOD PRESSURE: 60 MMHG | BODY MASS INDEX: 29.29 KG/M2 | OXYGEN SATURATION: 95 % | HEART RATE: 73 BPM | HEIGHT: 71 IN | SYSTOLIC BLOOD PRESSURE: 119 MMHG | WEIGHT: 209.2 LBS

## 2023-10-27 DIAGNOSIS — F51.01 PRIMARY INSOMNIA: ICD-10-CM

## 2023-10-27 DIAGNOSIS — Z00.00 ANNUAL PHYSICAL EXAM: Primary | ICD-10-CM

## 2023-10-27 DIAGNOSIS — Z13.1 SCREENING FOR DIABETES MELLITUS: ICD-10-CM

## 2023-10-27 DIAGNOSIS — Z12.5 SCREENING FOR PROSTATE CANCER: ICD-10-CM

## 2023-10-27 DIAGNOSIS — F33.0 MILD EPISODE OF RECURRENT MAJOR DEPRESSIVE DISORDER (HCC): ICD-10-CM

## 2023-10-27 DIAGNOSIS — E78.5 HYPERLIPIDEMIA, UNSPECIFIED HYPERLIPIDEMIA TYPE: ICD-10-CM

## 2023-10-27 PROCEDURE — 99213 OFFICE O/P EST LOW 20 MIN: CPT | Performed by: FAMILY MEDICINE

## 2023-10-27 PROCEDURE — 99396 PREV VISIT EST AGE 40-64: CPT | Performed by: FAMILY MEDICINE

## 2023-10-27 RX ORDER — ZOLPIDEM TARTRATE 5 MG/1
TABLET ORAL
Status: CANCELLED | OUTPATIENT
Start: 2023-10-27

## 2023-10-27 RX ORDER — ZOLPIDEM TARTRATE 5 MG/1
TABLET ORAL
COMMUNITY
Start: 2023-09-19 | End: 2023-10-27

## 2023-10-27 RX ORDER — ZOLPIDEM TARTRATE 10 MG/1
10 TABLET ORAL NIGHTLY PRN
Qty: 15 TABLET | Refills: 2 | Status: SHIPPED | OUTPATIENT
Start: 2023-10-27 | End: 2023-12-11

## 2023-10-27 RX ORDER — SERTRALINE HYDROCHLORIDE 100 MG/1
TABLET, FILM COATED ORAL
Qty: 270 TABLET | Refills: 1 | Status: SHIPPED | OUTPATIENT
Start: 2023-10-27

## 2023-10-27 ASSESSMENT — ENCOUNTER SYMPTOMS
COUGH: 0
ABDOMINAL PAIN: 0
CONSTIPATION: 0
SHORTNESS OF BREATH: 0
NAUSEA: 0
BLOOD IN STOOL: 0
DIARRHEA: 0

## 2023-10-30 LAB
ALBUMIN SERPL-MCNC: 4.2 G/DL
ALP BLD-CCNC: 62 U/L
ALT SERPL-CCNC: 11 U/L
ANION GAP SERPL CALCULATED.3IONS-SCNC: 2 MMOL/L
AST SERPL-CCNC: 15 U/L
AVERAGE GLUCOSE: NORMAL
BASOPHILS ABSOLUTE: 0 /ΜL
BASOPHILS RELATIVE PERCENT: 0.4 %
BILIRUB SERPL-MCNC: 0.4 MG/DL (ref 0.1–1.4)
BUN BLDV-MCNC: 14 MG/DL
CALCIUM SERPL-MCNC: 9.1 MG/DL
CHLORIDE BLD-SCNC: 104 MMOL/L
CHOLESTEROL, TOTAL: 246 MG/DL
CHOLESTEROL/HDL RATIO: ABNORMAL
CO2: 27 MMOL/L
CREAT SERPL-MCNC: 1.1 MG/DL
EGFR: 76
EOSINOPHILS ABSOLUTE: 0.2 /ΜL
EOSINOPHILS RELATIVE PERCENT: 2.4 %
GLUCOSE BLD-MCNC: 95 MG/DL
HBA1C MFR BLD: 5.6 %
HCT VFR BLD CALC: 42.1 % (ref 41–53)
HDLC SERPL-MCNC: 30 MG/DL (ref 35–70)
HEMOGLOBIN: 14.4 G/DL (ref 13.5–17.5)
LDL CHOLESTEROL CALCULATED: 155 MG/DL (ref 0–160)
LYMPHOCYTES ABSOLUTE: 2.8 /ΜL
LYMPHOCYTES RELATIVE PERCENT: 29.1 %
MCH RBC QN AUTO: 30.3 PG
MCHC RBC AUTO-ENTMCNC: 34.2 G/DL
MCV RBC AUTO: 88.4 FL
MONOCYTES ABSOLUTE: 0.6 /ΜL
MONOCYTES RELATIVE PERCENT: 6.1 %
NEUTROPHILS ABSOLUTE: 5.9 /ΜL
NEUTROPHILS RELATIVE PERCENT: 61.8 %
NONHDLC SERPL-MCNC: ABNORMAL MG/DL
PDW BLD-RTO: 13.2 %
PLATELET # BLD: 244 K/ΜL
PMV BLD AUTO: 11.4 FL
POTASSIUM SERPL-SCNC: 4.3 MMOL/L
PROSTATE SPECIFIC ANTIGEN: 2.95 NG/ML
RBC # BLD: 4.76 10^6/ΜL
SODIUM BLD-SCNC: 141 MMOL/L
TOTAL PROTEIN: 6.3
TRIGL SERPL-MCNC: 304 MG/DL
VLDLC SERPL CALC-MCNC: 61 MG/DL
WBC # BLD: 9.5 10^3/ML

## 2023-11-02 DIAGNOSIS — Z00.00 ANNUAL PHYSICAL EXAM: ICD-10-CM

## 2023-11-02 DIAGNOSIS — Z12.5 SCREENING FOR PROSTATE CANCER: ICD-10-CM

## 2023-11-02 DIAGNOSIS — Z13.1 SCREENING FOR DIABETES MELLITUS: ICD-10-CM

## 2023-11-02 DIAGNOSIS — E78.5 HYPERLIPIDEMIA, UNSPECIFIED HYPERLIPIDEMIA TYPE: ICD-10-CM

## 2023-11-02 ASSESSMENT — ENCOUNTER SYMPTOMS: BACK PAIN: 1

## 2024-01-10 ENCOUNTER — TELEPHONE (OUTPATIENT)
Dept: INTERNAL MEDICINE CLINIC | Age: 64
End: 2024-01-10

## 2024-01-10 RX ORDER — VALACYCLOVIR HYDROCHLORIDE 1 G/1
1000 TABLET, FILM COATED ORAL DAILY
Qty: 90 TABLET | Refills: 1 | Status: SHIPPED | OUTPATIENT
Start: 2024-01-10

## 2024-01-12 ENCOUNTER — OFFICE VISIT (OUTPATIENT)
Dept: FAMILY MEDICINE CLINIC | Age: 64
End: 2024-01-12
Payer: MEDICARE

## 2024-01-12 VITALS
OXYGEN SATURATION: 95 % | WEIGHT: 209 LBS | HEART RATE: 71 BPM | DIASTOLIC BLOOD PRESSURE: 68 MMHG | TEMPERATURE: 98 F | SYSTOLIC BLOOD PRESSURE: 106 MMHG | BODY MASS INDEX: 29.15 KG/M2

## 2024-01-12 DIAGNOSIS — H01.004 BLEPHARITIS OF LEFT UPPER EYELID, UNSPECIFIED TYPE: Primary | ICD-10-CM

## 2024-01-12 PROCEDURE — 1036F TOBACCO NON-USER: CPT | Performed by: NURSE PRACTITIONER

## 2024-01-12 PROCEDURE — 99213 OFFICE O/P EST LOW 20 MIN: CPT | Performed by: NURSE PRACTITIONER

## 2024-01-12 PROCEDURE — G8419 CALC BMI OUT NRM PARAM NOF/U: HCPCS | Performed by: NURSE PRACTITIONER

## 2024-01-12 PROCEDURE — 3017F COLORECTAL CA SCREEN DOC REV: CPT | Performed by: NURSE PRACTITIONER

## 2024-01-12 PROCEDURE — G8484 FLU IMMUNIZE NO ADMIN: HCPCS | Performed by: NURSE PRACTITIONER

## 2024-01-12 PROCEDURE — G8427 DOCREV CUR MEDS BY ELIG CLIN: HCPCS | Performed by: NURSE PRACTITIONER

## 2024-01-12 RX ORDER — ZOLPIDEM TARTRATE 10 MG/1
TABLET ORAL
COMMUNITY

## 2024-01-12 RX ORDER — ERYTHROMYCIN 5 MG/G
OINTMENT OPHTHALMIC
Qty: 3.5 G | Refills: 0 | Status: SHIPPED | OUTPATIENT
Start: 2024-01-12 | End: 2024-01-22

## 2024-01-12 RX ORDER — ROSUVASTATIN CALCIUM 5 MG/1
1 TABLET, COATED ORAL DAILY
COMMUNITY

## 2024-01-12 ASSESSMENT — ENCOUNTER SYMPTOMS
VOMITING: 0
SORE THROAT: 0
EYE ITCHING: 0
SINUS PAIN: 0
SHORTNESS OF BREATH: 0
DIARRHEA: 0
RHINORRHEA: 0
SINUS PRESSURE: 0
EYE PAIN: 0
WHEEZING: 0
EYE DISCHARGE: 1
NAUSEA: 0
PHOTOPHOBIA: 0
COUGH: 0
CHEST TIGHTNESS: 0
EYE REDNESS: 0

## 2024-01-12 NOTE — PROGRESS NOTES
Jamari Mack   63 y.o.  male  [id number]      Chief Complaint   Patient presents with    Conjunctivitis     left        Subjective:  63 y.o.male is here for a follow up. He has the following chronic/acute medical problems:  Patient Active Problem List   Diagnosis    Primary insomnia    Hyperlipidemia    Herpes labialis    Glaucoma    Mild episode of recurrent major depressive disorder (HCC)    Gastroesophageal reflux disease without esophagitis    IFG (impaired fasting glucose)    Localized osteoarthritis of shoulder regions, bilateral       Patient is here with complaints of conjunctivitis left eye. Patient states he noticed this Wednesday night. Patient states his left upper eye lid is slightly swollen and red. Patient states he has had some crusty drainage.        Review of Systems   Constitutional:  Negative for appetite change, chills, fatigue and fever.   HENT:  Negative for congestion, ear pain, postnasal drip, rhinorrhea, sinus pressure, sinus pain, sneezing and sore throat.    Eyes:  Positive for discharge. Negative for photophobia, pain, redness, itching and visual disturbance.   Respiratory:  Negative for cough, chest tightness, shortness of breath and wheezing.    Cardiovascular:  Negative for chest pain and palpitations.   Gastrointestinal:  Negative for diarrhea, nausea and vomiting.   Skin:  Negative for rash.   Neurological:  Negative for dizziness, light-headedness and headaches.       Current Outpatient Medications   Medication Sig Dispense Refill    zolpidem (AMBIEN) 10 MG tablet TAKE 1 TABLET BY MOUTH NIGHTLY AS NEEDED FOR SLEEP FOR UP TO 45 DAYS. MAX DAILY AMOUNT: 10 MG      erythromycin (ROMYCIN) 5 MG/GM ophthalmic ointment Apply 1 cm ribbon to left eye BID x 7 days. 3.5 g 0    sertraline (ZOLOFT) 100 MG tablet Take 3 tablets by mouth  daily 270 tablet 1    rosuvastatin (CRESTOR) 5 MG tablet Take 1 tablet by mouth daily (Patient not taking: Reported on 1/12/2024)      valACYclovir

## 2024-11-04 ENCOUNTER — OFFICE VISIT (OUTPATIENT)
Dept: INTERNAL MEDICINE CLINIC | Age: 64
End: 2024-11-04
Payer: MEDICARE

## 2024-11-04 VITALS
WEIGHT: 211 LBS | BODY MASS INDEX: 29.43 KG/M2 | SYSTOLIC BLOOD PRESSURE: 130 MMHG | HEART RATE: 66 BPM | DIASTOLIC BLOOD PRESSURE: 70 MMHG | OXYGEN SATURATION: 97 %

## 2024-11-04 DIAGNOSIS — Z79.899 LONG TERM USE OF DRUG: ICD-10-CM

## 2024-11-04 DIAGNOSIS — F33.0 MILD EPISODE OF RECURRENT MAJOR DEPRESSIVE DISORDER (HCC): Primary | ICD-10-CM

## 2024-11-04 DIAGNOSIS — S61.210A LACERATION OF RIGHT INDEX FINGER WITHOUT FOREIGN BODY WITHOUT DAMAGE TO NAIL, INITIAL ENCOUNTER: ICD-10-CM

## 2024-11-04 DIAGNOSIS — B00.1 HERPES LABIALIS: ICD-10-CM

## 2024-11-04 DIAGNOSIS — E78.5 HYPERLIPIDEMIA, UNSPECIFIED HYPERLIPIDEMIA TYPE: ICD-10-CM

## 2024-11-04 PROCEDURE — 3017F COLORECTAL CA SCREEN DOC REV: CPT | Performed by: FAMILY MEDICINE

## 2024-11-04 PROCEDURE — 99214 OFFICE O/P EST MOD 30 MIN: CPT | Performed by: FAMILY MEDICINE

## 2024-11-04 PROCEDURE — G8484 FLU IMMUNIZE NO ADMIN: HCPCS | Performed by: FAMILY MEDICINE

## 2024-11-04 PROCEDURE — 1036F TOBACCO NON-USER: CPT | Performed by: FAMILY MEDICINE

## 2024-11-04 PROCEDURE — G8419 CALC BMI OUT NRM PARAM NOF/U: HCPCS | Performed by: FAMILY MEDICINE

## 2024-11-04 PROCEDURE — G8427 DOCREV CUR MEDS BY ELIG CLIN: HCPCS | Performed by: FAMILY MEDICINE

## 2024-11-04 RX ORDER — MULTIVIT WITH MINERALS/LUTEIN
1000 TABLET ORAL DAILY
COMMUNITY

## 2024-11-04 RX ORDER — PERPHENAZINE/AMITRIPTYLINE HCL 4MG-10MG
TABLET ORAL
COMMUNITY

## 2024-11-04 ASSESSMENT — ENCOUNTER SYMPTOMS
SHORTNESS OF BREATH: 0
NAUSEA: 0
BACK PAIN: 0
COUGH: 0
ABDOMINAL PAIN: 0

## 2024-11-04 NOTE — PROGRESS NOTES
Jamari Mack (:  1960) is a 64 y.o. male,established patient, here for evaluation of the following chief complaint(s):  Wound Check (Cut his knuckle on his right index finger about 1 week ago-red and swollen) and Hyperlipidemia      Assessment & Plan   ASSESSMENT/PLAN:    Assessment & Plan  1. Laceration of right index finger without foreign body without damage to nail, initial encounter.  He will start Augmentin 875-125 mg/tab., take 1 tablet by mouth two times daily for 7 days with food, dispense 14 tablets, refill zero.  ADR's explained    2. Mild episode of recurrent major depressive disorder.  He has stopped the Zoloft. He feels stable at this time.    3. Hyperlipidemia, unspecified hyperlipidemia type.  He declines to use statins. He has been trying to manage with dietary changes.   A lipid panel has been ordered.    4. Herpes labialis, chronic  He is using Valtrex as needed.    5. Long term use of drug.  A CBC with auto differential, and a complete metabolic panel has been ordered.    Medications reconciled and discussed with the patient  Discussed health risks. The patient is asked to make an attempt to improve diet and exercise patterns   Recommend colon cancer screening. He plans to obtain PSA testing at [facility]  On this date 24, I spent 30 minutes reviewing previous notes and test results as well as a face to face encounter discussing diagnosis and treatment plan, physical exam and documenting the day of the visit  Return if symptoms worsen or fail to improve.       Lab Results   Component Value Date    WBC 9.5 10/30/2023    HGB 14.4 10/30/2023    HCT 42.1 10/30/2023    MCV 88.4 10/30/2023     10/30/2023       Lab Results   Component Value Date    LABA1C 5.6 10/30/2023     Lab Results   Component Value Date    .0 2022     Lab Results   Component Value Date     10/30/2023    K 4.3 10/30/2023     10/30/2023    CO2 27 10/30/2023    BUN 14 10/30/2023

## 2024-11-15 ENCOUNTER — HOSPITAL ENCOUNTER (OUTPATIENT)
Age: 64
Discharge: HOME | End: 2024-11-15
Payer: MEDICARE

## 2024-11-15 ENCOUNTER — OFFICE VISIT (OUTPATIENT)
Dept: INTERNAL MEDICINE CLINIC | Age: 64
End: 2024-11-15
Payer: MEDICARE

## 2024-11-15 ENCOUNTER — HOSPITAL ENCOUNTER (OUTPATIENT)
Dept: GENERAL RADIOLOGY | Age: 64
Discharge: HOME | End: 2024-11-15
Payer: MEDICARE

## 2024-11-15 VITALS
BODY MASS INDEX: 29.57 KG/M2 | HEART RATE: 61 BPM | WEIGHT: 212 LBS | DIASTOLIC BLOOD PRESSURE: 64 MMHG | OXYGEN SATURATION: 97 % | SYSTOLIC BLOOD PRESSURE: 132 MMHG

## 2024-11-15 DIAGNOSIS — L03.011 CELLULITIS OF FINGER OF RIGHT HAND: ICD-10-CM

## 2024-11-15 DIAGNOSIS — F51.01 PRIMARY INSOMNIA: ICD-10-CM

## 2024-11-15 DIAGNOSIS — S61.210A LACERATION OF RIGHT INDEX FINGER WITHOUT FOREIGN BODY WITHOUT DAMAGE TO NAIL, INITIAL ENCOUNTER: Primary | ICD-10-CM

## 2024-11-15 DIAGNOSIS — S61.210A LACERATION OF RIGHT INDEX FINGER WITHOUT FOREIGN BODY WITHOUT DAMAGE TO NAIL, INITIAL ENCOUNTER: ICD-10-CM

## 2024-11-15 DIAGNOSIS — M79.644 FINGER PAIN, RIGHT: ICD-10-CM

## 2024-11-15 PROCEDURE — 73140 X-RAY EXAM OF FINGER(S): CPT

## 2024-11-15 PROCEDURE — G8419 CALC BMI OUT NRM PARAM NOF/U: HCPCS | Performed by: FAMILY MEDICINE

## 2024-11-15 PROCEDURE — G8427 DOCREV CUR MEDS BY ELIG CLIN: HCPCS | Performed by: FAMILY MEDICINE

## 2024-11-15 PROCEDURE — 3017F COLORECTAL CA SCREEN DOC REV: CPT | Performed by: FAMILY MEDICINE

## 2024-11-15 PROCEDURE — G8484 FLU IMMUNIZE NO ADMIN: HCPCS | Performed by: FAMILY MEDICINE

## 2024-11-15 PROCEDURE — 1036F TOBACCO NON-USER: CPT | Performed by: FAMILY MEDICINE

## 2024-11-15 PROCEDURE — 99214 OFFICE O/P EST MOD 30 MIN: CPT | Performed by: FAMILY MEDICINE

## 2024-11-15 RX ORDER — SULFAMETHOXAZOLE AND TRIMETHOPRIM 800; 160 MG/1; MG/1
1 TABLET ORAL 2 TIMES DAILY
Qty: 14 TABLET | Refills: 0 | Status: SHIPPED | OUTPATIENT
Start: 2024-11-15 | End: 2024-11-22

## 2024-11-15 RX ORDER — ZOLPIDEM TARTRATE 5 MG/1
5 TABLET ORAL NIGHTLY PRN
Qty: 30 TABLET | Refills: 0 | Status: SHIPPED | OUTPATIENT
Start: 2024-11-15 | End: 2024-12-15

## 2024-11-15 SDOH — ECONOMIC STABILITY: FOOD INSECURITY: WITHIN THE PAST 12 MONTHS, THE FOOD YOU BOUGHT JUST DIDN'T LAST AND YOU DIDN'T HAVE MONEY TO GET MORE.: NEVER TRUE

## 2024-11-15 SDOH — ECONOMIC STABILITY: FOOD INSECURITY: WITHIN THE PAST 12 MONTHS, YOU WORRIED THAT YOUR FOOD WOULD RUN OUT BEFORE YOU GOT MONEY TO BUY MORE.: NEVER TRUE

## 2024-11-15 SDOH — ECONOMIC STABILITY: INCOME INSECURITY: HOW HARD IS IT FOR YOU TO PAY FOR THE VERY BASICS LIKE FOOD, HOUSING, MEDICAL CARE, AND HEATING?: NOT HARD AT ALL

## 2024-11-15 ASSESSMENT — PATIENT HEALTH QUESTIONNAIRE - PHQ9
3. TROUBLE FALLING OR STAYING ASLEEP: NOT AT ALL
2. FEELING DOWN, DEPRESSED OR HOPELESS: NOT AT ALL
7. TROUBLE CONCENTRATING ON THINGS, SUCH AS READING THE NEWSPAPER OR WATCHING TELEVISION: NOT AT ALL
9. THOUGHTS THAT YOU WOULD BE BETTER OFF DEAD, OR OF HURTING YOURSELF: NOT AT ALL
SUM OF ALL RESPONSES TO PHQ QUESTIONS 1-9: 0
8. MOVING OR SPEAKING SO SLOWLY THAT OTHER PEOPLE COULD HAVE NOTICED. OR THE OPPOSITE, BEING SO FIGETY OR RESTLESS THAT YOU HAVE BEEN MOVING AROUND A LOT MORE THAN USUAL: NOT AT ALL
5. POOR APPETITE OR OVEREATING: NOT AT ALL
10. IF YOU CHECKED OFF ANY PROBLEMS, HOW DIFFICULT HAVE THESE PROBLEMS MADE IT FOR YOU TO DO YOUR WORK, TAKE CARE OF THINGS AT HOME, OR GET ALONG WITH OTHER PEOPLE: NOT DIFFICULT AT ALL
1. LITTLE INTEREST OR PLEASURE IN DOING THINGS: NOT AT ALL
4. FEELING TIRED OR HAVING LITTLE ENERGY: NOT AT ALL
SUM OF ALL RESPONSES TO PHQ QUESTIONS 1-9: 0
SUM OF ALL RESPONSES TO PHQ9 QUESTIONS 1 & 2: 0
6. FEELING BAD ABOUT YOURSELF - OR THAT YOU ARE A FAILURE OR HAVE LET YOURSELF OR YOUR FAMILY DOWN: NOT AT ALL

## 2024-11-15 ASSESSMENT — ENCOUNTER SYMPTOMS
SHORTNESS OF BREATH: 0
NAUSEA: 0
COUGH: 0
ABDOMINAL PAIN: 0

## 2024-11-15 NOTE — PROGRESS NOTES
Jamari Mack (:  1960) is a 64 y.o. male,established patient, here for evaluation of the following chief complaint(s):  Other (Was seen for a finger laceration and was given a antibiotic)      Assessment & Plan   ASSESSMENT/PLAN:    Assessment & Plan  1. Laceration of right index finger without foreign body without damage to nail, initial encounter.  An X-ray of the right finger has been ordered.   He will start Bactrim -160 mg tablets, taking 1 tablet by mouth two times daily for 7 days, dispense 14 tablets, refill zero.    2. Finger pain, right.  He should take Tylenol arthritis over the counter. He should avoid picking at the laceration site.    3. Cellulitis of finger, right hand.  He will start Bactrim -160 mg tablets.    4. Primary insomnia, chronic.  He will start Ambien 5 mg tablets, taking 1 tablet by mouth nightly as needed for sleep for up to 30 days, dispense 30 tablets, refill zero.  OARRS checked  ADR's explained    Medications reconciled and discussed with the patient    Return if symptoms worsen or fail to improve.       Lab Results   Component Value Date    WBC 9.5 10/30/2023    HGB 14.4 10/30/2023    HCT 42.1 10/30/2023    MCV 88.4 10/30/2023     10/30/2023       Lab Results   Component Value Date    LABA1C 5.6 10/30/2023     Lab Results   Component Value Date    .0 2022     Lab Results   Component Value Date     10/30/2023    K 4.3 10/30/2023     10/30/2023    CO2 27 10/30/2023    BUN 14 10/30/2023    CREATININE 1.1 10/30/2023    GLUCOSE 95 10/30/2023    CALCIUM 9.1 10/30/2023    BILITOT 0.4 10/30/2023    ALKPHOS 62 10/30/2023    AST 15 10/30/2023    ALT 11 10/30/2023    LABGLOM 76 10/30/2023    GFRAA >60 2021     Lab Results   Component Value Date    TSH 2.320 2023         Subjective   SUBJECTIVE/OBJECTIVE:    HISTORY OF PRESENT ILLNESS:      History of Present Illness  The patient is a 64-year-old male who presents today for

## 2024-11-18 ENCOUNTER — TELEPHONE (OUTPATIENT)
Dept: INTERNAL MEDICINE CLINIC | Age: 64
End: 2024-11-18

## 2024-11-18 DIAGNOSIS — M79.644 FINGER PAIN, RIGHT: ICD-10-CM

## 2024-11-18 DIAGNOSIS — S60.459D FOREIGN BODY IN SKIN OF FINGER, SUBSEQUENT ENCOUNTER: ICD-10-CM

## 2024-11-18 DIAGNOSIS — S61.210A LACERATION OF RIGHT INDEX FINGER WITHOUT FOREIGN BODY WITHOUT DAMAGE TO NAIL, INITIAL ENCOUNTER: Primary | ICD-10-CM

## 2024-11-18 DIAGNOSIS — S60.450A FOREIGN BODY OF RIGHT INDEX FINGER: Primary | ICD-10-CM

## 2024-11-18 DIAGNOSIS — S61.210A LACERATION OF RIGHT INDEX FINGER WITHOUT FOREIGN BODY WITHOUT DAMAGE TO NAIL, INITIAL ENCOUNTER: ICD-10-CM

## 2024-11-18 NOTE — TELEPHONE ENCOUNTER
Patient called about his finger imaging stating the order was sent to Dr. Hernandez but he could not make an appt and wants the order to go to Dr. Duke at [facility].
